# Patient Record
Sex: FEMALE | HISPANIC OR LATINO | Employment: STUDENT | URBAN - NONMETROPOLITAN AREA
[De-identification: names, ages, dates, MRNs, and addresses within clinical notes are randomized per-mention and may not be internally consistent; named-entity substitution may affect disease eponyms.]

---

## 2017-03-16 ENCOUNTER — OFFICE VISIT (OUTPATIENT)
Dept: PULMONOLOGY | Facility: CLINIC | Age: 22
End: 2017-03-16

## 2017-03-16 ENCOUNTER — LAB (OUTPATIENT)
Dept: LAB | Facility: HOSPITAL | Age: 22
End: 2017-03-16
Attending: INTERNAL MEDICINE

## 2017-03-16 ENCOUNTER — PROCEDURE VISIT (OUTPATIENT)
Dept: PULMONOLOGY | Facility: CLINIC | Age: 22
End: 2017-03-16

## 2017-03-16 VITALS
DIASTOLIC BLOOD PRESSURE: 64 MMHG | HEART RATE: 71 BPM | BODY MASS INDEX: 16.62 KG/M2 | RESPIRATION RATE: 18 BRPM | OXYGEN SATURATION: 98 % | SYSTOLIC BLOOD PRESSURE: 94 MMHG | WEIGHT: 88 LBS | HEIGHT: 61 IN

## 2017-03-16 DIAGNOSIS — R06.02 SHORTNESS OF BREATH: Primary | ICD-10-CM

## 2017-03-16 DIAGNOSIS — J30.89 OTHER ALLERGIC RHINITIS: ICD-10-CM

## 2017-03-16 DIAGNOSIS — J45.40 MODERATE PERSISTENT ASTHMA WITHOUT COMPLICATION: ICD-10-CM

## 2017-03-16 DIAGNOSIS — R06.02 SOB (SHORTNESS OF BREATH): Primary | ICD-10-CM

## 2017-03-16 PROCEDURE — 86003 ALLG SPEC IGE CRUDE XTRC EA: CPT | Performed by: INTERNAL MEDICINE

## 2017-03-16 PROCEDURE — 99244 OFF/OP CNSLTJ NEW/EST MOD 40: CPT | Performed by: INTERNAL MEDICINE

## 2017-03-16 PROCEDURE — 36415 COLL VENOUS BLD VENIPUNCTURE: CPT

## 2017-03-16 PROCEDURE — 94726 PLETHYSMOGRAPHY LUNG VOLUMES: CPT | Performed by: INTERNAL MEDICINE

## 2017-03-16 PROCEDURE — 95012 NITRIC OXIDE EXP GAS DETER: CPT | Performed by: INTERNAL MEDICINE

## 2017-03-16 PROCEDURE — 94060 EVALUATION OF WHEEZING: CPT | Performed by: INTERNAL MEDICINE

## 2017-03-16 PROCEDURE — 82785 ASSAY OF IGE: CPT | Performed by: INTERNAL MEDICINE

## 2017-03-16 RX ORDER — FLUNISOLIDE 0.25 MG/ML
1 SOLUTION NASAL EVERY 12 HOURS
Qty: 1 BOTTLE | Refills: 5 | Status: SHIPPED | OUTPATIENT
Start: 2017-03-16 | End: 2017-07-31 | Stop reason: SDUPTHER

## 2017-03-16 RX ORDER — MONTELUKAST SODIUM 10 MG/1
10 TABLET ORAL NIGHTLY
COMMUNITY
End: 2017-07-31 | Stop reason: SDUPTHER

## 2017-03-16 NOTE — PROGRESS NOTES
"CONSULT NOTE    Chief Complaint   Patient presents with   • Asthma     NP       Subjective   Jocelyne Barth is a 21 y.o. female.     History of Present Illness   Patient comes in today for consultation because of shortness of breath for the past 1 year.  The patient recently moved from [place] and although she had asthma as a child, her symptoms were under decent control.  She notices some worsening in her symptoms and says that she has been wheezing more often and also is having cough which is one of the major symptoms.    The patient denies any pets at home. Patient also complains of runny nose and dribbling in the back of the throat for the past few weeks. This has been sometimes associated with seasonal variation, and she feels that for the past 6 months it has been worse than before.    The patient denies any history of smoking.    The following portions of the patient's history were reviewed and updated as appropriate: allergies, current medications, past family history, past medical history, past social history and past surgical history.    Review of Systems   Constitutional: Positive for chills and fatigue.   HENT: Positive for sinus pressure, sneezing and sore throat.    Respiratory: Positive for choking and shortness of breath.    Cardiovascular: Negative.    Psychiatric/Behavioral: Positive for sleep disturbance.   All other systems reviewed and are negative.      Past Medical History   Diagnosis Date   • Asthma, extrinsic    • Bronchiectasis    • IBS (irritable bowel syndrome)        Social History   Substance Use Topics   • Smoking status: Never Smoker   • Smokeless tobacco: Never Used   • Alcohol use No         Objective   Visit Vitals   • BP 94/64   • Pulse 71   • Resp 18   • Ht 61\" (154.9 cm)   • Wt 88 lb (39.9 kg)   • SpO2 98%   • BMI 16.63 kg/m2     Physical Exam  Constitutional:            General: No acute distress noted. Able to communicate appropriately    Head/Face/Eyes:            No " significant facial abnormalities seen.            Extra ocular movement was intact.            Pupils appeared equal    ENT:            Sinuses were non tender on palpation.            Nostril showed moderate erythema.            Oropharynx was cobblestoned.    Neck:             Supple. No JVD noted.              Thyroid gland did not seem to be enlarged    Cardiovascular:              S1 + S2. Regular    Respiratory:            Respiratory effort was adequate            Normal to percussion.            Good Air Entry Bilaterally with no wheezing or crackles heard.    Musculoskeletal/Extremities:             Normal Gait.              No clubbing, cyanosis noted in the upper extremities.             No edema noted in the lower extremities bilaterally.    Neurologic/Psychiatric:             Affect appeared fair.             Awake, alert and oriented x 3.             Able to follow simple commands.    Skin:             No rash noted.             Warm and dry        Assessment/Plan   Jocelyne was seen today for asthma.    Diagnoses and all orders for this visit:    Shortness of breath  -     Nitric Oxide    Moderate persistent asthma without complication  -     Pulmonary Function Test  -     Nitric Oxide    Other allergic rhinitis  -     IgE; Future  -     Allergens, Zone 8; Future    Other orders  -     flunisolide (NASALIDE) 25 MCG/ACT (0.025%) solution nasal spray; Inhale 1 spray Every 12 (Twelve) Hours.           Return in about 10 weeks (around 5/25/2017) for Recheck, Labs.    DISCUSSION(if any):  I have also reviewed her last primary care provider's office note that mentions asthma and shortness of breath along with cough.    ==============================  ==============================    I had a detailed discussion with the patient regarding her symptoms which are very suggestive of moderate persistent asthma.    She was advised to continue her current medications.    Patient will be started on nasal steroids  for symptoms which are definitely consistent with allergic rhinitis.     I have ordered IgE/RAST panel.    Other contributing factors may also need to be treated and this will be discussed with the patient, if and when applicable.    The patient was advised to maintain a log of the use of rescue inhaler.    FeNO level was reviewed.    PFTs were reviewed.    Patient was given reading material.          Errors in dictation may reflect use of voice recognition software and not all errors in transcription may have been detected prior to signing    This document was electronically signed by Virginia Nicholson MD March 16, 2017  10:24 AM

## 2017-03-17 LAB — TOTAL IGE SMQN RAST: 16 IU/ML (ref 0–100)

## 2017-03-20 LAB
A ALTERNATA IGE QN: <0.1 KU/L
A FUMIGATUS IGE QN: <0.1 KU/L
AMER ROACH IGE QN: <0.1 KU/L
BAHIA GRASS IGE QN: <0.1 KU/L
BERMUDA GRASS IGE QN: <0.1 KU/L
BOXELDER IGE QN: <0.1 KU/L
C HERBARUM IGE QN: <0.1 KU/L
CAT DANDER IGG QN: <0.1 KU/L
CMN PIGWEED IGE QN: <0.1 KU/L
COMMON RAGWEED IGE QN: <0.1 KU/L
CONV CLASS DESCRIPTION: NORMAL
D FARINAE IGE QN: <0.1 KU/L
D PTERONYSS IGE QN: <0.1 KU/L
DOG DANDER IGE QN: <0.1 KU/L
ENGL PLANTAIN IGE QN: <0.1 KU/L
HAZELNUT POLN IGE QN: <0.1 KU/L
JOHNSON GRASS IGE QN: <0.1 KU/L
KENT BLUE GRASS IGE QN: <0.1 KU/L
M RACEMOSUS IGE QN: <0.1 KU/L
MT JUNIPER IGE QN: <0.1 KU/L
MUGWORT IGE QN: <0.1 KU/L
NETTLE IGE QN: <0.1 KU/L
P NOTATUM IGE QN: <0.1 KU/L
S BOTRYOSUM IGE QN: <0.1 KU/L
SHEEP SORREL IGE QN: <0.1 KU/L
SWEET GUM IGE QN: <0.1 KU/L
T011-IGE MAPLE LEAF SYCAMORE: <0.1 KU/L
WHITE ELM IGE QN: <0.1 KU/L
WHITE HICKORY IGE QN: <0.1 KU/L
WHITE MULBERRY IGE QN: <0.1 KU/L
WHITE OAK IGE QN: <0.1 KU/L

## 2017-05-25 ENCOUNTER — OFFICE VISIT (OUTPATIENT)
Dept: PULMONOLOGY | Facility: CLINIC | Age: 22
End: 2017-05-25

## 2017-05-25 ENCOUNTER — APPOINTMENT (OUTPATIENT)
Dept: LAB | Facility: HOSPITAL | Age: 22
End: 2017-05-25
Attending: INTERNAL MEDICINE

## 2017-05-25 ENCOUNTER — LAB (OUTPATIENT)
Dept: LAB | Facility: HOSPITAL | Age: 22
End: 2017-05-25
Attending: INTERNAL MEDICINE

## 2017-05-25 ENCOUNTER — TRANSCRIBE ORDERS (OUTPATIENT)
Dept: ADMINISTRATIVE | Facility: HOSPITAL | Age: 22
End: 2017-05-25

## 2017-05-25 VITALS
HEART RATE: 76 BPM | OXYGEN SATURATION: 98 % | DIASTOLIC BLOOD PRESSURE: 64 MMHG | WEIGHT: 87 LBS | RESPIRATION RATE: 18 BRPM | HEIGHT: 61 IN | BODY MASS INDEX: 16.42 KG/M2 | SYSTOLIC BLOOD PRESSURE: 100 MMHG

## 2017-05-25 DIAGNOSIS — R06.02 SHORTNESS OF BREATH: Primary | ICD-10-CM

## 2017-05-25 DIAGNOSIS — R06.02 SHORTNESS OF BREATH: ICD-10-CM

## 2017-05-25 DIAGNOSIS — J30.89 OTHER ALLERGIC RHINITIS: ICD-10-CM

## 2017-05-25 DIAGNOSIS — J45.40 MODERATE PERSISTENT ASTHMA WITHOUT COMPLICATION: ICD-10-CM

## 2017-05-25 DIAGNOSIS — R06.02 SOB (SHORTNESS OF BREATH): Primary | ICD-10-CM

## 2017-05-25 LAB
BASOPHILS # BLD AUTO: 0.02 10*3/MM3 (ref 0–0.2)
BASOPHILS NFR BLD AUTO: 0.5 % (ref 0–2.5)
DEPRECATED RDW RBC AUTO: 41.7 FL (ref 37–54)
EOSINOPHIL # BLD AUTO: 0.06 10*3/MM3 (ref 0–0.7)
EOSINOPHIL NFR BLD AUTO: 1.4 % (ref 0–7)
ERYTHROCYTE [DISTWIDTH] IN BLOOD BY AUTOMATED COUNT: 11.4 % (ref 11.5–14.5)
HCT VFR BLD AUTO: 41 % (ref 37–47)
HGB BLD-MCNC: 13.4 G/DL (ref 12–16)
IMM GRANULOCYTES # BLD: 0.01 10*3/MM3 (ref 0–0.06)
IMM GRANULOCYTES NFR BLD: 0.2 % (ref 0–0.6)
LYMPHOCYTES # BLD AUTO: 1.5 10*3/MM3 (ref 0.6–3.4)
LYMPHOCYTES NFR BLD AUTO: 34.5 % (ref 10–50)
MCH RBC QN AUTO: 32.2 PG (ref 27–31)
MCHC RBC AUTO-ENTMCNC: 32.7 G/DL (ref 30–37)
MCV RBC AUTO: 98.6 FL (ref 81–99)
MONOCYTES # BLD AUTO: 0.42 10*3/MM3 (ref 0–0.9)
MONOCYTES NFR BLD AUTO: 9.7 % (ref 0–12)
NEUTROPHILS # BLD AUTO: 2.34 10*3/MM3 (ref 2–6.9)
NEUTROPHILS NFR BLD AUTO: 53.7 % (ref 37–80)
NRBC BLD MANUAL-RTO: 0 /100 WBC (ref 0–0)
PLATELET # BLD AUTO: 245 10*3/MM3 (ref 130–400)
PMV BLD AUTO: 10.7 FL (ref 6–12)
RBC # BLD AUTO: 4.16 10*6/MM3 (ref 4.2–5.4)
WBC NRBC COR # BLD: 4.35 10*3/MM3 (ref 4.8–10.8)

## 2017-05-25 PROCEDURE — 94729 DIFFUSING CAPACITY: CPT | Performed by: INTERNAL MEDICINE

## 2017-05-25 PROCEDURE — 94726 PLETHYSMOGRAPHY LUNG VOLUMES: CPT | Performed by: INTERNAL MEDICINE

## 2017-05-25 PROCEDURE — 85025 COMPLETE CBC W/AUTO DIFF WBC: CPT | Performed by: INTERNAL MEDICINE

## 2017-05-25 PROCEDURE — 99214 OFFICE O/P EST MOD 30 MIN: CPT | Performed by: INTERNAL MEDICINE

## 2017-05-25 PROCEDURE — 94060 EVALUATION OF WHEEZING: CPT | Performed by: INTERNAL MEDICINE

## 2017-05-25 PROCEDURE — 36415 COLL VENOUS BLD VENIPUNCTURE: CPT | Performed by: INTERNAL MEDICINE

## 2017-07-31 ENCOUNTER — OFFICE VISIT (OUTPATIENT)
Dept: PULMONOLOGY | Facility: CLINIC | Age: 22
End: 2017-07-31

## 2017-07-31 VITALS
HEART RATE: 84 BPM | WEIGHT: 90.5 LBS | HEIGHT: 61 IN | RESPIRATION RATE: 14 BRPM | BODY MASS INDEX: 17.09 KG/M2 | OXYGEN SATURATION: 98 % | DIASTOLIC BLOOD PRESSURE: 59 MMHG | SYSTOLIC BLOOD PRESSURE: 89 MMHG

## 2017-07-31 DIAGNOSIS — J30.89 OTHER ALLERGIC RHINITIS: ICD-10-CM

## 2017-07-31 DIAGNOSIS — R06.02 SOB (SHORTNESS OF BREATH): Primary | ICD-10-CM

## 2017-07-31 DIAGNOSIS — J45.40 MODERATE PERSISTENT ASTHMA WITHOUT COMPLICATION: ICD-10-CM

## 2017-07-31 PROCEDURE — 99214 OFFICE O/P EST MOD 30 MIN: CPT | Performed by: NURSE PRACTITIONER

## 2017-07-31 RX ORDER — FLUNISOLIDE 0.25 MG/ML
1 SOLUTION NASAL EVERY 12 HOURS
Qty: 1 BOTTLE | Refills: 5 | Status: SHIPPED | OUTPATIENT
Start: 2017-07-31 | End: 2018-01-30 | Stop reason: SDUPTHER

## 2017-07-31 RX ORDER — ALBUTEROL SULFATE 90 UG/1
2 AEROSOL, METERED RESPIRATORY (INHALATION) EVERY 6 HOURS PRN
Qty: 1 INHALER | Refills: 2 | Status: SHIPPED | OUTPATIENT
Start: 2017-07-31 | End: 2018-01-30 | Stop reason: SDUPTHER

## 2017-07-31 RX ORDER — MONTELUKAST SODIUM 10 MG/1
10 TABLET ORAL NIGHTLY
Qty: 30 TABLET | Refills: 3 | Status: SHIPPED | OUTPATIENT
Start: 2017-07-31 | End: 2018-01-30 | Stop reason: SDUPTHER

## 2017-07-31 RX ORDER — LANSOPRAZOLE 30 MG/1
30 CAPSULE, DELAYED RELEASE ORAL DAILY
Qty: 30 CAPSULE | Refills: 1 | Status: SHIPPED | OUTPATIENT
Start: 2017-07-31 | End: 2017-11-09 | Stop reason: ALTCHOICE

## 2017-11-09 ENCOUNTER — OFFICE VISIT (OUTPATIENT)
Dept: PULMONOLOGY | Facility: CLINIC | Age: 22
End: 2017-11-09

## 2017-11-09 VITALS
WEIGHT: 91.4 LBS | OXYGEN SATURATION: 98 % | SYSTOLIC BLOOD PRESSURE: 80 MMHG | DIASTOLIC BLOOD PRESSURE: 57 MMHG | BODY MASS INDEX: 16.82 KG/M2 | HEIGHT: 62 IN | HEART RATE: 55 BPM | RESPIRATION RATE: 16 BRPM

## 2017-11-09 DIAGNOSIS — R06.02 SHORTNESS OF BREATH: ICD-10-CM

## 2017-11-09 DIAGNOSIS — J45.40 MODERATE PERSISTENT ASTHMA WITHOUT COMPLICATION: ICD-10-CM

## 2017-11-09 DIAGNOSIS — K21.9 GASTROESOPHAGEAL REFLUX DISEASE, ESOPHAGITIS PRESENCE NOT SPECIFIED: Primary | ICD-10-CM

## 2017-11-09 DIAGNOSIS — J30.89 OTHER ALLERGIC RHINITIS: ICD-10-CM

## 2017-11-09 PROCEDURE — 99214 OFFICE O/P EST MOD 30 MIN: CPT | Performed by: INTERNAL MEDICINE

## 2017-11-09 PROCEDURE — 95012 NITRIC OXIDE EXP GAS DETER: CPT | Performed by: INTERNAL MEDICINE

## 2017-11-09 RX ORDER — ESOMEPRAZOLE MAGNESIUM 40 MG/1
40 FOR SUSPENSION ORAL 2 TIMES DAILY
Qty: 60 PACKET | Refills: 1 | Status: SHIPPED | OUTPATIENT
Start: 2017-11-09 | End: 2017-11-09 | Stop reason: SDUPTHER

## 2017-11-09 RX ORDER — PANTOPRAZOLE SODIUM 40 MG/1
40 TABLET, DELAYED RELEASE ORAL 2 TIMES DAILY
Qty: 60 TABLET | Refills: 1 | Status: SHIPPED | OUTPATIENT
Start: 2017-11-09 | End: 2018-01-17 | Stop reason: SDUPTHER

## 2017-11-09 RX ORDER — ESOMEPRAZOLE MAGNESIUM 40 MG/1
20 FOR SUSPENSION ORAL 2 TIMES DAILY
Qty: 60 PACKET | Refills: 1 | Status: SHIPPED | OUTPATIENT
Start: 2017-11-09 | End: 2017-11-09

## 2017-11-09 NOTE — PROGRESS NOTES
"Chief Complaint   Patient presents with   • Follow-up   • Shortness of Breath         Subjective   Jocelyne Barth is a 22 y.o. female.     History of Present Illness   Patient comes in today to follow-up on asthma and allergic rhinitis.    Patient has stopped using all her asthma medications and says that her symptoms are only somewhat worsened.    She is using her rescue inhaler at least once or twice a day but tries not to use that either.  When asked, she says that she has significant acid reflux which is making it almost impossible for her to use any medication as she is afraid that her symptoms are worsened?    The patient is once again complaining of runny nose and dribbling in the back of her throat.    The patient wakes up in the morning with scratchy throat and hoarseness and occasional wheezing.    The following portions of the patient's history were reviewed and updated as appropriate: allergies, current medications, past family history, past medical history, past social history and past surgical history.    Review of Systems   Constitutional: Positive for fatigue. Negative for chills and fever.   HENT: Positive for congestion and sore throat. Negative for sinus pressure and sneezing.    Respiratory: Positive for chest tightness. Negative for cough and wheezing.        Objective   Visit Vitals   • BP (!) 80/57   • Pulse 55   • Resp 16   • Ht 62\" (157.5 cm)   • Wt 91 lb 6.4 oz (41.5 kg)   • SpO2 98%   • BMI 16.72 kg/m2       Physical Exam   Constitutional: She is oriented to person, place, and time. She appears well-developed and well-nourished.   HENT:   Sinuses were non tender on palpation.  Nostril showed moderate erythema.  Oropharynx was cobblestoned.   Eyes: EOM are normal.   Neck: Neck supple. No JVD present.   Cardiovascular: Normal rate and regular rhythm.    Pulmonary/Chest: Effort normal and breath sounds normal.   Musculoskeletal:   Gait was normal.   Neurological: She is alert and " oriented to person, place, and time.   Skin: Skin is warm and dry.   Vitals reviewed.        Assessment/Plan   Jocelyne was seen today for follow-up and shortness of breath.    Diagnoses and all orders for this visit:    Gastroesophageal reflux disease, esophagitis presence not specified    Moderate persistent asthma without complication  -     Nitric Oxide    Other allergic rhinitis    Shortness of breath  -     Nitric Oxide    Other orders  -     Discontinue: esomeprazole (NEXIUM) 40 MG packet; Take 40 mg by mouth 2 (Two) Times a Day.  -     Discontinue: esomeprazole (NEXIUM) 40 MG packet; Take 20 mg by mouth 2 (Two) Times a Day.  -     pantoprazole (PROTONIX) 40 MG EC tablet; Take 1 tablet by mouth 2 (Two) Times a Day.         Return in about 10 weeks (around 1/18/2018) for Recheck, For Rosa.    DISCUSSION (if any):  She clearly has symptoms of gastroesophageal reflux disease and I prescribed Protonix 40 mg to be taken twice daily.    Told the patient that if her symptoms do not improve over the next 2-4 weeks, then she should definitely ask her primary care provider for further recommendations and possible gastroenterology workup/consultation.    As far as asthma is concerned, she will need to restart all her medications especially given some worsening symptoms.    She will definitely need to be on nasal steroids again, as her allergic rhinitis is also worsened.    FeNO level was < 5.    Compliance was stressed to the patient.      Dictated utilizing Dragon dictation.    This document was electronically signed by Virginia Nicholson MD November 9, 2017  10:46 AM

## 2017-12-21 ENCOUNTER — OFFICE VISIT (OUTPATIENT)
Dept: GASTROENTEROLOGY | Facility: CLINIC | Age: 22
End: 2017-12-21

## 2017-12-21 ENCOUNTER — PREP FOR SURGERY (OUTPATIENT)
Dept: OTHER | Facility: HOSPITAL | Age: 22
End: 2017-12-21

## 2017-12-21 VITALS
WEIGHT: 92 LBS | HEIGHT: 61 IN | DIASTOLIC BLOOD PRESSURE: 41 MMHG | BODY MASS INDEX: 17.37 KG/M2 | SYSTOLIC BLOOD PRESSURE: 80 MMHG | RESPIRATION RATE: 14 BRPM | TEMPERATURE: 98.2 F | HEART RATE: 55 BPM

## 2017-12-21 DIAGNOSIS — R10.32 LEFT LOWER QUADRANT PAIN: Primary | ICD-10-CM

## 2017-12-21 DIAGNOSIS — R19.7 DIARRHEA, UNSPECIFIED TYPE: Chronic | ICD-10-CM

## 2017-12-21 DIAGNOSIS — R11.0 NAUSEA: Primary | ICD-10-CM

## 2017-12-21 DIAGNOSIS — R12 HEARTBURN: Chronic | ICD-10-CM

## 2017-12-21 DIAGNOSIS — R11.0 NAUSEA: Chronic | ICD-10-CM

## 2017-12-21 DIAGNOSIS — K59.00 CONSTIPATION, UNSPECIFIED CONSTIPATION TYPE: ICD-10-CM

## 2017-12-21 DIAGNOSIS — R19.7 DIARRHEA, UNSPECIFIED TYPE: ICD-10-CM

## 2017-12-21 DIAGNOSIS — R10.13 EPIGASTRIC PAIN: ICD-10-CM

## 2017-12-21 DIAGNOSIS — R13.10 DYSPHAGIA, UNSPECIFIED TYPE: ICD-10-CM

## 2017-12-21 DIAGNOSIS — R13.10 DYSPHAGIA, UNSPECIFIED TYPE: Chronic | ICD-10-CM

## 2017-12-21 DIAGNOSIS — Z12.11 COLON CANCER SCREENING: ICD-10-CM

## 2017-12-21 DIAGNOSIS — R12 HEARTBURN: ICD-10-CM

## 2017-12-21 DIAGNOSIS — R10.13 EPIGASTRIC PAIN: Chronic | ICD-10-CM

## 2017-12-21 DIAGNOSIS — K59.00 CONSTIPATION, UNSPECIFIED CONSTIPATION TYPE: Chronic | ICD-10-CM

## 2017-12-21 PROBLEM — K21.9 CHRONIC GERD: Status: ACTIVE | Noted: 2017-12-21

## 2017-12-21 PROBLEM — J45.30 MILD PERSISTENT ASTHMA WITHOUT COMPLICATION: Status: ACTIVE | Noted: 2017-12-21

## 2017-12-21 PROBLEM — R53.83 FATIGUE: Status: ACTIVE | Noted: 2017-12-21

## 2017-12-21 PROCEDURE — 99214 OFFICE O/P EST MOD 30 MIN: CPT | Performed by: NURSE PRACTITIONER

## 2017-12-21 RX ORDER — CIPROFLOXACIN 500 MG/1
TABLET, FILM COATED ORAL
Qty: 14 TABLET | Refills: 0 | Status: ON HOLD | OUTPATIENT
Start: 2017-12-21 | End: 2018-01-08

## 2017-12-21 RX ORDER — SODIUM CHLORIDE 9 MG/ML
70 INJECTION, SOLUTION INTRAVENOUS CONTINUOUS PRN
Status: CANCELLED | OUTPATIENT
Start: 2017-12-21

## 2017-12-21 RX ORDER — ONDANSETRON 4 MG/1
4 TABLET, FILM COATED ORAL EVERY 8 HOURS PRN
Qty: 30 TABLET | Refills: 1 | Status: SHIPPED | OUTPATIENT
Start: 2017-12-21 | End: 2018-01-20

## 2017-12-21 RX ORDER — METRONIDAZOLE 250 MG/1
250 TABLET ORAL 4 TIMES DAILY
Qty: 28 TABLET | Refills: 0 | Status: SHIPPED | OUTPATIENT
Start: 2017-12-21 | End: 2017-12-28

## 2017-12-21 NOTE — PATIENT INSTRUCTIONS
1.Treatment for diverticulitis:  A. Low-fat low fiber diet for 5 days thereafter low-fat high-fiber diet.   B. Cipro (ciprofloxacin) tablets 500 mg. Take 1 tablet by mouth twice a day for 7 days. Side effects were discussed.   C. Flagyl (metronidazole) tablets 250 mg. Take 1 tablet one by mouth 4 times a day for 7 days. Side effects were discussed.  D. Avoid laxatives, enemas for next 5 days. However, for constipation the patient may use stool softeners.  E. Colonoscopy: Description of the procedure, risks, benefits, alternatives and options, including nonoperative options, were discussed with the patient in detail. The patient understands and wishes to proceed, although it may be advisable to wait 3-4 weeks to schedule procedure.  2. Antireflux measures: Avoid fried, fatty foods, alcohol, chocolate, coffee, tea,  soft drinks, peppermint and spearmint, spicy foods, tomatoes and tomato based foods, onion based foods, and smoking. Other antireflux measures include weight reduction if overweight, avoiding tight clothing around the abdomen, elevating the head of the bed 6 inches with blocks under the head board, and don't drink or eat before going to bed and avoid lying down immediately after meals.  3. Continue Pantoprazole 40 mg 1 po twice a day, take am dose 30 minutes before breakfast.  4. Zofran 4 mg 1 po every 8 hours as needed for nausea.  5. Upper endoscopy-EGD: Description of the procedure, risks, benefits, alternatives and options, including nonoperative options, were discussed with the patient in detail. The patient understands and wishes to proceed.

## 2017-12-21 NOTE — PROGRESS NOTES
"Chief Complaint   Patient presents with   • Abdominal Pain   • Nausea   • Heartburn   • Difficulty Swallowing   • Constipation   • Diarrhea     There is a long-standing history of heartburn. The patient has been taking Pantoprazole 40 mg twice a day with moderate improvement of heartburn. The patient has heartburn daily. It can be severe. The patient does wake at night with heartburn.    The patient feels like something is \"stuck\" in her esophagus after she eats. This occurs every time she eats. The difficulty swallowing occurs with solids and liquids.    The patient has a history of epigastric pain since July 2017. The pain occurs daily. The pain is moderate. Eating worsens the pain. She feels like her food is \"not digesting\" and is causing the pain. At times, the patient has had pain in her lower left quadrant. The pain is moderate. It will come and go. The pain started about one month ago.    The patient has a history of new onset nausea. The patient has nausea every morning when she wakes up. Eating can make the nausea better. She is not taking anything for nausea. The patient denies possibility of pregnancy.    Over the past 1 year, the patient has been having diarrhea alternating with constipation. This has worsened over the past month or so. The patient has more constipation symptoms than diarrhea. The patient has tried Miralax perhaps once per week for the constipation with good results. She may have 1 bowel movement every week or so. The patient then may have diarrhea 1-2 days per month with 2-4 episodes of diarrhea during that day. The patient denies bright red blood per rectum or melena.    The patient has not had a colonoscopy in the past. There is a family history of colon cancer in the patient's uncle.    Abdominal Pain   This is a chronic problem. Episode onset: July 2017. The onset quality is sudden. The problem occurs daily. The problem has been unchanged. The pain is located in the epigastric " region. The quality of the pain is burning. The abdominal pain does not radiate. Associated symptoms include constipation, diarrhea, headaches and nausea. Pertinent negatives include no arthralgias, dysuria, fever, hematuria, myalgias or vomiting. The pain is aggravated by eating. The pain is relieved by nothing. She has tried proton pump inhibitors for the symptoms. The treatment provided no relief. Her past medical history is significant for GERD.   Nausea   This is a new problem. The current episode started in the past 7 days. The problem occurs daily. The problem has been unchanged. Associated symptoms include abdominal pain, fatigue, headaches and nausea. Pertinent negatives include no arthralgias, chest pain, chills, coughing, fever, joint swelling, myalgias, rash or vomiting. Nothing aggravates the symptoms. She has tried eating for the symptoms. The treatment provided moderate relief.   Heartburn   She complains of abdominal pain, heartburn and nausea. She reports no chest pain or no coughing. This is a chronic problem. Episode onset: 2014. The problem occurs frequently. The problem has been unchanged. The heartburn duration is an hour. The heartburn is located in the substernum. The heartburn is of severe intensity. The heartburn wakes her from sleep. Nothing aggravates the symptoms. Associated symptoms include fatigue. She has tried a PPI for the symptoms. The treatment provided moderate relief.   Difficulty Swallowing   This is a chronic problem. Episode onset: July 2017. The problem occurs 2 to 4 times per day. The problem has been unchanged. Associated symptoms include abdominal pain, fatigue, headaches and nausea. Pertinent negatives include no arthralgias, chest pain, chills, coughing, fever, joint swelling, myalgias, rash or vomiting. The symptoms are aggravated by drinking and eating. She has tried nothing for the symptoms.   Constipation   This is a chronic problem. The current episode started more  than 1 year ago. The stool is described as firm. The patient is on a high fiber diet. There has been adequate water intake. Associated symptoms include abdominal pain, diarrhea and nausea. Pertinent negatives include no fever or vomiting. Treatments tried: Miralax. The treatment provided significant relief.   Diarrhea    Associated symptoms include abdominal pain and headaches. Pertinent negatives include no arthralgias, chills, coughing, fever, myalgias or vomiting.     Review of Systems   Constitutional: Positive for fatigue. Negative for appetite change, chills, fever and unexpected weight change.   HENT: Positive for trouble swallowing. Negative for mouth sores and nosebleeds.    Eyes: Negative for discharge and redness.   Respiratory: Negative for apnea, cough and shortness of breath.    Cardiovascular: Positive for palpitations. Negative for chest pain and leg swelling.   Gastrointestinal: Positive for abdominal distention, abdominal pain, constipation, diarrhea, heartburn and nausea. Negative for anal bleeding, blood in stool and vomiting.   Endocrine: Negative for cold intolerance, heat intolerance and polydipsia.   Genitourinary: Negative for dysuria, hematuria and urgency.   Musculoskeletal: Negative for arthralgias, joint swelling and myalgias.   Skin: Negative for rash.   Allergic/Immunologic: Positive for food allergies. Negative for immunocompromised state.        Nuts and eggs   Neurological: Positive for headaches. Negative for dizziness, seizures and syncope.   Hematological: Negative for adenopathy. Does not bruise/bleed easily.   Psychiatric/Behavioral: Negative for dysphoric mood. The patient is not nervous/anxious and is not hyperactive.      Patient Active Problem List   Diagnosis   • Asthma   • Chronic GERD   • Fatigue   • Mild persistent asthma without complication   • Left lower quadrant pain   • Constipation   • Diarrhea   • Epigastric pain   • Heartburn   • Dysphagia   • Nausea     Past  Medical History:   Diagnosis Date   • Asthma, extrinsic    • B12 deficiency 2017   • Bronchiectasis    • GERD (gastroesophageal reflux disease)    • IBS (irritable bowel syndrome)    • Migraines 2010     Past Surgical History:   Procedure Laterality Date   • NO PAST SURGERIES       Family History   Problem Relation Age of Onset   • Irritable bowel syndrome Mother    • No Known Problems Father    • Colon cancer Maternal Uncle    • Stomach cancer Maternal Grandfather      Social History   Substance Use Topics   • Smoking status: Never Smoker   • Smokeless tobacco: Never Used   • Alcohol use No       Current Outpatient Prescriptions:   •  albuterol (PROVENTIL HFA;VENTOLIN HFA) 108 (90 BASE) MCG/ACT inhaler, Inhale 2 puffs Every 6 (Six) Hours As Needed for Wheezing or Shortness of Air., Disp: 1 inhaler, Rfl: 2  •  Cetirizine HCl 10 MG capsule, Take 10 mg by mouth Every Night., Disp: 30 capsule, Rfl: 3  •  flunisolide (NASALIDE) 25 MCG/ACT (0.025%) solution nasal spray, Inhale 1 spray Every 12 (Twelve) Hours., Disp: 1 bottle, Rfl: 5  •  fluticasone-salmeterol (ADVAIR) 500-50 MCG/DOSE DISKUS, Inhale 1 puff 2 (Two) Times a Day., Disp: 60 each, Rfl: 3  •  montelukast (SINGULAIR) 10 MG tablet, Take 1 tablet by mouth Every Night., Disp: 30 tablet, Rfl: 3  •  pantoprazole (PROTONIX) 40 MG EC tablet, Take 1 tablet by mouth 2 (Two) Times a Day., Disp: 60 tablet, Rfl: 1  •  Tiotropium Bromide Monohydrate (SPIRIVA RESPIMAT) 1.25 MCG/ACT aerosol solution, Inhale 2 puffs Daily., Disp: 1 inhaler, Rfl: 0  •  ciprofloxacin (CIPRO) 500 MG tablet, Take 1 tablet twice a day x 7 days, Disp: 14 tablet, Rfl: 0  •  metroNIDAZOLE (FLAGYL) 250 MG tablet, Take 1 tablet by mouth 4 (Four) Times a Day for 7 days. Take 1 tablet four times daily x 7 days, Disp: 28 tablet, Rfl: 0  •  ondansetron (ZOFRAN) 4 MG tablet, Take 1 tablet by mouth Every 8 (Eight) Hours As Needed for Nausea or Vomiting for up to 30 days., Disp: 30 tablet, Rfl: 1    Allergies  "  Allergen Reactions   • Ibuprofen Itching and Rash     BP (!) 80/41  Pulse 55  Temp 98.2 °F (36.8 °C)  Resp 14  Ht 154.9 cm (61\")  Wt 41.7 kg (92 lb)  LMP 12/19/2017  BMI 17.38 kg/m2    Physical Exam   Constitutional: She is oriented to person, place, and time. She appears well-developed and well-nourished. No distress.   HENT:   Head: Normocephalic and atraumatic.   Right Ear: Hearing and external ear normal.   Left Ear: Hearing and external ear normal.   Nose: Nose normal.   Mouth/Throat: Oropharynx is clear and moist and mucous membranes are normal. Mucous membranes are not pale, not dry and not cyanotic. No oral lesions. No oropharyngeal exudate.   Eyes: Conjunctivae and EOM are normal. Right eye exhibits no discharge. Left eye exhibits no discharge.   Neck: Trachea normal. Neck supple. No JVD present. No edema present. No thyroid mass and no thyromegaly present.   Cardiovascular: Normal rate, regular rhythm, S2 normal and normal heart sounds.  Exam reveals no gallop, no S3 and no friction rub.    No murmur heard.  Pulmonary/Chest: Effort normal and breath sounds normal. No respiratory distress. She exhibits no tenderness.   Abdominal: Normal appearance and bowel sounds are normal. She exhibits no distension, no ascites and no mass. There is no splenomegaly or hepatomegaly. There is tenderness (moderate) in the epigastric area and left lower quadrant. There is no rigidity, no rebound and no guarding. No hernia.       Vascular Status -  Her exam exhibits no right foot edema. Her exam exhibits no left foot edema.  Lymphadenopathy:     She has no cervical adenopathy.        Left: No supraclavicular adenopathy present.   Neurological: She is alert and oriented to person, place, and time. She has normal strength. No cranial nerve deficit or sensory deficit.   Skin: No rash noted. She is not diaphoretic. No cyanosis. No pallor. Nails show no clubbing.   Psychiatric: She has a normal mood and affect.   Nursing " note and vitals reviewed.  Stigmata of chronic liver disease:  None.  Asterixis:  None.    Laboratory Results:  Upon review of records:    Dated 5/25/2017 WBC 4.35 hemoglobin 13.4 hematocrit 41.0 place a count 245 MCV 98.6    Assessment and Plan:    Jocelyne was seen today for abdominal pain, nausea, heartburn, difficulty swallowing, constipation and diarrhea.    Diagnoses and all orders for this visit:    Left lower quadrant pain  Comments:  Consistent with possible diverticulitis.  Orders:  -     ciprofloxacin (CIPRO) 500 MG tablet; Take 1 tablet twice a day x 7 days  -     metroNIDAZOLE (FLAGYL) 250 MG tablet; Take 1 tablet by mouth 4 (Four) Times a Day for 7 days. Take 1 tablet four times daily x 7 days    Constipation, unspecified constipation type  Comments:  History of long-standing constipation with recent worsening of symptoms.  Constipation alternates with diarrhea.  Orders:  -     ciprofloxacin (CIPRO) 500 MG tablet; Take 1 tablet twice a day x 7 days  -     metroNIDAZOLE (FLAGYL) 250 MG tablet; Take 1 tablet by mouth 4 (Four) Times a Day for 7 days. Take 1 tablet four times daily x 7 days    Diarrhea, unspecified type  Comments:  History of long-standing diarrhea with worsening of symptoms over the past 1 month.  Differentials include diverticulitis, microscopic colitis, underlying IBD.  Orders:  -     ciprofloxacin (CIPRO) 500 MG tablet; Take 1 tablet twice a day x 7 days  -     metroNIDAZOLE (FLAGYL) 250 MG tablet; Take 1 tablet by mouth 4 (Four) Times a Day for 7 days. Take 1 tablet four times daily x 7 days    Epigastric pain  Comments:  Differentials include delayed gastric emptying, peptic ulcer disease, pancreatobiliary disease.    Heartburn  Comments:  History of long-standing heartburn not controlled with pantoprazole twice a day.  Concerns for underlying Beaver's.    Dysphagia, unspecified type  Comments:  Differentials include Schatzki's ring, esophagitis, esophageal  dysmotility.    Nausea  Comments:  Differentials include delayed gastric emptying, peptic ulcer disease, pancreatobiliary disease.  Orders:  -     ondansetron (ZOFRAN) 4 MG tablet; Take 1 tablet by mouth Every 8 (Eight) Hours As Needed for Nausea or Vomiting for up to 30 days.        Plan  and Patient Instructions:  Patient Instructions   1.Treatment for diverticulitis:  A. Low-fat low fiber diet for 5 days thereafter low-fat high-fiber diet.   B. Cipro (ciprofloxacin) tablets 500 mg. Take 1 tablet by mouth twice a day for 7 days. Side effects were discussed.   C. Flagyl (metronidazole) tablets 250 mg. Take 1 tablet one by mouth 4 times a day for 7 days. Side effects were discussed.  D. Avoid laxatives, enemas for next 5 days. However, for constipation the patient may use stool softeners.  E. Colonoscopy: Description of the procedure, risks, benefits, alternatives and options, including nonoperative options, were discussed with the patient in detail. The patient understands and wishes to proceed, although it may be advisable to wait 3-4 weeks to schedule procedure.  2. Antireflux measures: Avoid fried, fatty foods, alcohol, chocolate, coffee, tea,  soft drinks, peppermint and spearmint, spicy foods, tomatoes and tomato based foods, onion based foods, and smoking. Other antireflux measures include weight reduction if overweight, avoiding tight clothing around the abdomen, elevating the head of the bed 6 inches with blocks under the head board, and don't drink or eat before going to bed and avoid lying down immediately after meals.  3. Continue Pantoprazole 40 mg 1 po twice a day, take am dose 30 minutes before breakfast.  4. Zofran 4 mg 1 po every 8 hours as needed for nausea.  5. Upper endoscopy-EGD: Description of the procedure, risks, benefits, alternatives and options, including nonoperative options, were discussed with the patient in detail. The patient understands and wishes to proceed.    Carl Davis,  APRN

## 2017-12-22 PROBLEM — Z12.11 COLON CANCER SCREENING: Status: ACTIVE | Noted: 2017-12-22

## 2018-01-08 ENCOUNTER — HOSPITAL ENCOUNTER (OUTPATIENT)
Facility: HOSPITAL | Age: 23
Setting detail: HOSPITAL OUTPATIENT SURGERY
Discharge: HOME | End: 2018-01-08
Attending: INTERNAL MEDICINE | Admitting: INTERNAL MEDICINE

## 2018-01-08 ENCOUNTER — ANESTHESIA EVENT (OUTPATIENT)
Dept: GASTROENTEROLOGY | Facility: HOSPITAL | Age: 23
End: 2018-01-08

## 2018-01-08 ENCOUNTER — ANESTHESIA (OUTPATIENT)
Dept: GASTROENTEROLOGY | Facility: HOSPITAL | Age: 23
End: 2018-01-08

## 2018-01-08 VITALS
DIASTOLIC BLOOD PRESSURE: 58 MMHG | WEIGHT: 92 LBS | SYSTOLIC BLOOD PRESSURE: 91 MMHG | OXYGEN SATURATION: 99 % | BODY MASS INDEX: 16.93 KG/M2 | HEART RATE: 52 BPM | HEIGHT: 62 IN | RESPIRATION RATE: 18 BRPM | TEMPERATURE: 97.2 F

## 2018-01-08 DIAGNOSIS — R12 HEARTBURN: ICD-10-CM

## 2018-01-08 DIAGNOSIS — R11.0 NAUSEA: ICD-10-CM

## 2018-01-08 DIAGNOSIS — R13.10 DYSPHAGIA, UNSPECIFIED TYPE: ICD-10-CM

## 2018-01-08 DIAGNOSIS — R10.13 EPIGASTRIC PAIN: ICD-10-CM

## 2018-01-08 LAB
B-HCG UR QL: NEGATIVE
INTERNAL NEGATIVE CONTROL: NEGATIVE
INTERNAL POSITIVE CONTROL: POSITIVE
Lab: NORMAL

## 2018-01-08 PROCEDURE — 25010000002 MIDAZOLAM PER 1 MG: Performed by: NURSE ANESTHETIST, CERTIFIED REGISTERED

## 2018-01-08 PROCEDURE — 25010000002 PROPOFOL 200 MG/20ML EMULSION: Performed by: NURSE ANESTHETIST, CERTIFIED REGISTERED

## 2018-01-08 PROCEDURE — 43239 EGD BIOPSY SINGLE/MULTIPLE: CPT | Performed by: INTERNAL MEDICINE

## 2018-01-08 RX ORDER — SODIUM CHLORIDE 0.9 % (FLUSH) 0.9 %
3 SYRINGE (ML) INJECTION AS NEEDED
Status: DISCONTINUED | OUTPATIENT
Start: 2018-01-08 | End: 2018-01-08 | Stop reason: HOSPADM

## 2018-01-08 RX ORDER — MIDAZOLAM HYDROCHLORIDE 1 MG/ML
INJECTION INTRAMUSCULAR; INTRAVENOUS AS NEEDED
Status: DISCONTINUED | OUTPATIENT
Start: 2018-01-08 | End: 2018-01-08 | Stop reason: SURG

## 2018-01-08 RX ORDER — SODIUM CHLORIDE 9 MG/ML
70 INJECTION, SOLUTION INTRAVENOUS CONTINUOUS PRN
Status: DISCONTINUED | OUTPATIENT
Start: 2018-01-08 | End: 2018-01-08 | Stop reason: HOSPADM

## 2018-01-08 RX ORDER — PROPOFOL 10 MG/ML
INJECTION, EMULSION INTRAVENOUS AS NEEDED
Status: DISCONTINUED | OUTPATIENT
Start: 2018-01-08 | End: 2018-01-08 | Stop reason: SURG

## 2018-01-08 RX ADMIN — SODIUM CHLORIDE 70 ML/HR: 9 INJECTION, SOLUTION INTRAVENOUS at 09:00

## 2018-01-08 RX ADMIN — MIDAZOLAM HYDROCHLORIDE 1 MG: 1 INJECTION, SOLUTION INTRAMUSCULAR; INTRAVENOUS at 09:46

## 2018-01-08 RX ADMIN — PROPOFOL 50 MG: 10 INJECTION, EMULSION INTRAVENOUS at 09:51

## 2018-01-08 NOTE — PLAN OF CARE
Problem: GI Endoscopy (Adult)  Goal: Signs and Symptoms of Listed Potential Problems Will be Absent or Manageable (GI Endoscopy)  Outcome: Ongoing (interventions implemented as appropriate)   01/08/18 0924   GI Endoscopy   Problems Assessed (GI Endoscopy) all   Problems Present (GI Endoscopy) none

## 2018-01-08 NOTE — ANESTHESIA POSTPROCEDURE EVALUATION
Patient: Jocelyne Branch    Procedure Summary     Date Anesthesia Start Anesthesia Stop Room / Location    01/08/18 0946 1010 [facility] ENDOSCOPY 2 / [facility] ENDOSCOPY       Procedure Diagnosis Surgeon Provider    ESOPHAGOGASTRODUODENOSCOPY with biopsies (N/A Esophagus) Gastritis; Hiatal hernia; Esophagitis  (Heartburn [R12]; Nausea [R11.0]; Epigastric pain [R10.13]; Dysphagia, unspecified type [R13.10]) MD Aris Fabian CRNA          Anesthesia Type: MAC  Last vitals  BP 93/54   Temp   98.5   Pulse 58   Resp   16   SpO2   99     Post Anesthesia Care and Evaluation    Patient location during evaluation: bedside  Patient participation: complete - patient participated  Level of consciousness: awake and awake and alert  Pain score: 0  Pain management: satisfactory to patient  Airway patency: patent  Anesthetic complications: No anesthetic complications  PONV Status: none  Cardiovascular status: acceptable and stable  Respiratory status: acceptable, spontaneous ventilation, room air and nonlabored ventilation  Hydration status: acceptable

## 2018-01-08 NOTE — ANESTHESIA PREPROCEDURE EVALUATION
Anesthesia Evaluation     Patient summary reviewed and Nursing notes reviewed   no history of anesthetic complications:  NPO Solid Status: > 8 hours  NPO Liquid Status: > 8 hours     Airway   Mallampati: I  TM distance: >3 FB  Neck ROM: full  no difficulty expected  Dental - normal exam     Pulmonary - normal exam    breath sounds clear to auscultation  (+) asthma,   (-) not a smoker  Cardiovascular - negative cardio ROS and normal exam    Rhythm: regular  Rate: normal        Neuro/Psych  (+) headaches (Migraines),     GI/Hepatic/Renal/Endo    (+)  GERD,     Musculoskeletal (-) negative ROS    Abdominal    Substance History - negative use     OB/GYN negative ob/gyn ROS         Other - negative ROS                                               Anesthesia Plan    ASA 2     MAC   (Pt told that intravenous sedation will be used as the primary anesthetic. Every effort will be made to make sure the patient is comfortable.     The patient was told they may or may not have recall for the procedure.  Will proceed with the plan of care.)  intravenous induction   Anesthetic plan and risks discussed with patient.

## 2018-01-08 NOTE — OP NOTE
PROCEDURE:  Upper Endoscopy with biopsies.    DATE OF PROCEDURE: January 8, 2018.    REFERRING PROVIDER:  CARINA Clemons.     INSTRUMENT:  Olympus GIF H180 J video endoscope.       INDICATIONS OF THE PROCEDURE:  This is a 22-year-old white female with history of recurrent reflux, nausea, epigastric abdominal pain and intermittent dysphagia.  Currently undergoing upper endoscopy for further evaluation.       BIOPSIES: Second portion of duodenum.  Gastric antrum, angularis and body of the stomach.  Biopsies were obtained from the mid and distal esophagus     MEDICATIONS:  MAC.     PHOTOGRAPHS:  Photographs were included in the medical records.     CONSENT/PREPROCEDURE EVALUATION:  Risks, benefits, alternatives and options of the procedure including risks of anesthesia/sedation were discussed and informed consent was obtained prior to the procedure. History and physical examination were performed and nothing precluded the test.     REPORT:  The patient was placed in left lateral decubitus position. Once under the influence of IV sedation, the instrument was inserted into the mouth and esophagus was intubated under direct vision without difficulty.     Esophagus:  Z line was noted to be around 35 cm.  A small sliding hiatal hernia less than 3 cm was noted.  No Beaver's esophagus was seen.  Erythematous distal esophagitis was seen.  No stricture or Schatzki's ring was noted.  No concentric rings were seen.  Biopsies were obtained from the mid and distal esophagus.     Stomach:  Antrum:  Erythematous gastritis.  Angulus, lesser and greater curves: Normal.  Retroflex examination: Sliding hiatal hernia.  Cardia and fundus:  Normal.     Body of the stomach: Erythematous gastritis.  Good distensibility of the stomach was achieved no giant folds were noted.   Some bile tinged secretions were noted within the stomach.  There were suctioned.  Biopsies were obtained from the gastric antrum, angularis and body of the  stomach.    Pylorus and pyloric channel:  normal.     Duodenum:  Bulb: Normal.  Second portion: normal.  No scalloping was seen in the second portion of duodenum.  Biopsies were obtained from the second portion of duodenum.    Intervention:  None.       The upper GI tract was decompressed and the scope was pulled out of the patient. The patient tolerated the procedure well.     DIAGNOSES:     1. Erythematous distal esophagitis.  2. Small sliding hiatal hernia less than 3 cm.  3. Erythematous gastritis.  4. No Schatzki's ring or strictures were noted within the esophagus.  No concentric rings were seen.  Biopsies were obtained from the mid and distal esophagus.  The likely etiology of intermittent dysphagia is esophageal dysmotility.  5. Bile tinged secretions were seen within the stomach.  These were suctioned.  6. No active ulcer was noted.      RECOMMENDATIONS:  1.  Dietary instructions.  2.  Pantoprazole 40 mg 1 p.o. q.a.m. 1/2 hour before breakfast.  3.  Follow biopsies.  4.  Follow up in office in 1-2 weeks.  5.  Zofran 4 mg tablet.  1-to 3 times a day by mouth as needed for nausea.         Thank you very much for letting me participate in the care of this patient. Please do not hesitate to call me if you have any questions.

## 2018-01-08 NOTE — DISCHARGE INSTRUCTIONS
Rest today  No pushing,pulling,tugging,heavy lifting, or strenuous activity   No major decision making,driving,or drinking alcoholic beverages for 24 hours due to the sedation you received  Always use good hand hygiene/washing technique  No driving on pain medication.    To assist you in voiding:  Drink plenty of fluids  Listen to running water while attempting to void.    If you are unable to urinate and you have an uncomfortable urge to void or it has been   6 hours since you were discharged, return to the Emergency Room.    Postprocedure instructions.  Nothing by mouth until fully alert.  Once fully alert may have clear liquids.  Avoid soda beverages.  May advance diet as tolerated.  Bedrest until fully alert.  Follow-up precautions.    Diet:   Low fat diet.  Limit Coffee, Chocolate, Fried Foods,   Avoid Sodas,High Fat Foods, Cookies, Excessive Tomato or Onions, Alcohol and Smoking).    Blood Thinner Directions:  Avoid Aspirin & other NSAIDS for 7 days.  Tylenol is okay.    Treatments:    Other Instructions:  Call [facility] at [phone] or come to the Emergency Department if you experience the following: Chest pain, abdominal pain, bleeding (vomiting of blood or coffee colored material, black stools or jarvis blood in stools), fever/chills, nausea and vomiting or dizziness.      Follow-up:    DR. BRIANNA JIMENEZ in 1-2 weeks.Office phone # [phone].  If you already have an appointment keep the appointment.

## 2018-01-11 LAB
LAB AP CASE REPORT: NORMAL
Lab: NORMAL
PATH REPORT.FINAL DX SPEC: NORMAL

## 2018-01-18 RX ORDER — PANTOPRAZOLE SODIUM 40 MG/1
TABLET, DELAYED RELEASE ORAL
Qty: 60 TABLET | Refills: 0 | Status: SHIPPED | OUTPATIENT
Start: 2018-01-18 | End: 2018-02-27 | Stop reason: SDUPTHER

## 2018-01-24 ENCOUNTER — OFFICE VISIT (OUTPATIENT)
Dept: GASTROENTEROLOGY | Facility: CLINIC | Age: 23
End: 2018-01-24

## 2018-01-24 ENCOUNTER — LAB (OUTPATIENT)
Dept: LAB | Facility: HOSPITAL | Age: 23
End: 2018-01-24
Attending: INTERNAL MEDICINE

## 2018-01-24 VITALS
DIASTOLIC BLOOD PRESSURE: 52 MMHG | TEMPERATURE: 97.3 F | SYSTOLIC BLOOD PRESSURE: 83 MMHG | HEART RATE: 55 BPM | BODY MASS INDEX: 16.56 KG/M2 | HEIGHT: 62 IN | WEIGHT: 90 LBS | RESPIRATION RATE: 18 BRPM

## 2018-01-24 DIAGNOSIS — IMO0001 REGURGITATION: ICD-10-CM

## 2018-01-24 DIAGNOSIS — R13.10 DYSPHAGIA, UNSPECIFIED TYPE: ICD-10-CM

## 2018-01-24 DIAGNOSIS — R12 HEARTBURN: ICD-10-CM

## 2018-01-24 DIAGNOSIS — R10.13 EPIGASTRIC PAIN: Primary | ICD-10-CM

## 2018-01-24 DIAGNOSIS — K59.00 CONSTIPATION, UNSPECIFIED CONSTIPATION TYPE: ICD-10-CM

## 2018-01-24 DIAGNOSIS — R10.13 EPIGASTRIC PAIN: ICD-10-CM

## 2018-01-24 LAB
ALBUMIN SERPL-MCNC: 4.6 G/DL (ref 3.5–5)
ALBUMIN/GLOB SERPL: 1.5 G/DL (ref 1–2)
ALP SERPL-CCNC: 109 U/L (ref 38–126)
ALT SERPL W P-5'-P-CCNC: 47 U/L (ref 13–69)
ANION GAP SERPL CALCULATED.3IONS-SCNC: 13 MMOL/L
AST SERPL-CCNC: 37 U/L (ref 15–46)
BILIRUB SERPL-MCNC: 0.4 MG/DL (ref 0.2–1.3)
BUN BLD-MCNC: 6 MG/DL (ref 7–20)
BUN/CREAT SERPL: 10 (ref 7.1–23.5)
CALCIUM SPEC-SCNC: 9.2 MG/DL (ref 8.4–10.2)
CHLORIDE SERPL-SCNC: 102 MMOL/L (ref 98–107)
CO2 SERPL-SCNC: 30 MMOL/L (ref 26–30)
CREAT BLD-MCNC: 0.6 MG/DL (ref 0.6–1.3)
DEPRECATED RDW RBC AUTO: 40.1 FL (ref 37–54)
ERYTHROCYTE [DISTWIDTH] IN BLOOD BY AUTOMATED COUNT: 11.8 % (ref 11.5–14.5)
GFR SERPL CREATININE-BSD FRML MDRD: 127 ML/MIN/1.73
GFR SERPL CREATININE-BSD FRML MDRD: >150 ML/MIN/1.73
GLOBULIN UR ELPH-MCNC: 3 GM/DL
GLUCOSE BLD-MCNC: 88 MG/DL (ref 74–98)
HCT VFR BLD AUTO: 39.2 % (ref 37–47)
HGB BLD-MCNC: 13 G/DL (ref 12–16)
MCH RBC QN AUTO: 31.1 PG (ref 27–31)
MCHC RBC AUTO-ENTMCNC: 33.2 G/DL (ref 30–37)
MCV RBC AUTO: 93.8 FL (ref 81–99)
PLATELET # BLD AUTO: 248 10*3/MM3 (ref 130–400)
PMV BLD AUTO: 10.3 FL (ref 6–12)
POTASSIUM BLD-SCNC: 4 MMOL/L (ref 3.5–5.1)
PROT SERPL-MCNC: 7.6 G/DL (ref 6.3–8.2)
RBC # BLD AUTO: 4.18 10*6/MM3 (ref 4.2–5.4)
SODIUM BLD-SCNC: 141 MMOL/L (ref 137–145)
WBC NRBC COR # BLD: 3.5 10*3/MM3 (ref 4.8–10.8)

## 2018-01-24 PROCEDURE — 80053 COMPREHEN METABOLIC PANEL: CPT

## 2018-01-24 PROCEDURE — 85027 COMPLETE CBC AUTOMATED: CPT

## 2018-01-24 PROCEDURE — 36415 COLL VENOUS BLD VENIPUNCTURE: CPT

## 2018-01-24 PROCEDURE — 83516 IMMUNOASSAY NONANTIBODY: CPT

## 2018-01-24 PROCEDURE — 99214 OFFICE O/P EST MOD 30 MIN: CPT | Performed by: INTERNAL MEDICINE

## 2018-01-24 PROCEDURE — 82784 ASSAY IGA/IGD/IGG/IGM EACH: CPT

## 2018-01-24 RX ORDER — METOCLOPRAMIDE 5 MG/1
2.5 TABLET ORAL
Qty: 30 TABLET | Refills: 0 | Status: SHIPPED | OUTPATIENT
Start: 2018-01-24 | End: 2018-02-27 | Stop reason: SDUPTHER

## 2018-01-24 NOTE — PROGRESS NOTES
Chief Complaint   Patient presents with   • Abdominal Pain   • Nausea   • Difficulty Swallowing   • Heartburn   • Constipation       History of Present Illness     There is history of epigastric abdominal pain off and on for the last 2 years.  The pain is gradual in onset, moderate in severity and aching in character.  Frequency being 2-3 times a week.  The pain may last for several minutes.  There is no radiation of abdominal pain.  Eating worsens the abdominal pain.  No definite relieving factors of abdominal pain.     The patient has a history of reflux off and on for the last 4 years.  The reflux is rather severe.  Symptoms are described as retrosternal burning sensation, and indigestion.  There is history of significant regurgitative symptoms.  Frequency being several times per week.  The symptoms are worse at night.  The patient takes acid suppressive therapy with significant breakthrough symptoms.  The patient has difficulty swallowing off and for the last . The symptom is moderate in severity, occurs occasionally perhaps once or twice a week and is mostly associated with solid foods.  The symptoms are not progressive.  The patient points towards the lower substernal area.  There is no associated weight loss.    The patient has history of recurrent nausea for the last several months.  The nausea is described as mild to moderate in severity, frequency being a several times per week.  Nauseous feeling may last for several minutes.  Eating worsens the symptom however no definite relieving factors of nausea.  There is occasional associated vomiting.     The patient has a history of rather severe constipation off and on for the last 6 months. . This is described as hard stools perhaps one bowel movement once a week. The patient takes frequent laxatives to have a bowel movement. There is no associated rectal pain. Episodes of constipation are followed by episodes of diarrhea which occurred perhaps once a month or  "so.  During the diarrheal episodes the patient may have 3-4 bowel movements a day. The diarrhea may last for a day or so. There is no history of overt GI bleed (Hematemesis, melena or hematochezia). There is no history of liver disease or pancreatitis in the past.    In 2014 the patient claims that she started having these symptoms. She was diagnosed to have \"H pylori. The patient has been treated for the same. However her symptoms never improved.      Review of Systems   Constitutional: Positive for fatigue. Negative for appetite change, chills, fever and unexpected weight change.   HENT: Positive for trouble swallowing. Negative for mouth sores and nosebleeds.    Eyes: Negative for discharge and redness.   Respiratory: Positive for choking. Negative for apnea, cough and shortness of breath.    Cardiovascular: Positive for palpitations. Negative for chest pain and leg swelling.   Gastrointestinal: Positive for abdominal pain and constipation. Negative for abdominal distention, anal bleeding, blood in stool, diarrhea, nausea and vomiting.   Endocrine: Negative for cold intolerance, heat intolerance and polydipsia.   Genitourinary: Negative for dysuria, hematuria and urgency.   Musculoskeletal: Negative for arthralgias, joint swelling and myalgias.   Skin: Negative for rash.   Allergic/Immunologic: Positive for food allergies. Negative for immunocompromised state.   Neurological: Negative for dizziness, seizures, syncope and headaches.   Hematological: Negative for adenopathy. Does not bruise/bleed easily.   Psychiatric/Behavioral: Negative for dysphoric mood. The patient is not nervous/anxious and is not hyperactive.      Patient Active Problem List   Diagnosis   • Asthma   • Chronic GERD   • Fatigue   • Mild persistent asthma without complication   • Left lower quadrant pain   • Constipation   • Diarrhea   • Epigastric pain   • Heartburn   • Dysphagia   • Nausea   • Colon cancer screening     Past Medical History: "   Diagnosis Date   • Asthma, extrinsic    • B12 deficiency    • Bronchiectasis    • Diverticulitis of colon     Treated with antibiotics   • GERD (gastroesophageal reflux disease)    • IBS (irritable bowel syndrome)    • Lactose intolerance    • Migraines    • Problems with swallowing    • Wears glasses     TO READ     Past Surgical History:   Procedure Laterality Date   • ENDOSCOPY N/A 2018    Procedure: ESOPHAGOGASTRODUODENOSCOPY with biopsies;  Surgeon: Esau Robertson MD;  Location: [facility] ENDOSCOPY;  Service:    • NO PAST SURGERIES     • UPPER GASTROINTESTINAL ENDOSCOPY       Family History   Problem Relation Age of Onset   • Irritable bowel syndrome Mother    • No Known Problems Father    • Colon cancer Maternal Uncle    • Stomach cancer Maternal Grandfather         • Colon cancer Maternal Uncle           Social History   Substance Use Topics   • Smoking status: Never Smoker   • Smokeless tobacco: Never Used      Comment: Never smoked   • Alcohol use No       Current Outpatient Prescriptions:   •  albuterol (PROVENTIL HFA;VENTOLIN HFA) 108 (90 BASE) MCG/ACT inhaler, Inhale 2 puffs Every 6 (Six) Hours As Needed for Wheezing or Shortness of Air., Disp: 1 inhaler, Rfl: 2  •  Cetirizine HCl 10 MG capsule, Take 10 mg by mouth Every Night., Disp: 30 capsule, Rfl: 3  •  Cyanocobalamin (VITAMIN B-12 IJ), Inject 1 dose as directed Every 7 (Seven) Days., Disp: , Rfl:   •  flunisolide (NASALIDE) 25 MCG/ACT (0.025%) solution nasal spray, Inhale 1 spray Every 12 (Twelve) Hours., Disp: 1 bottle, Rfl: 5  •  fluticasone-salmeterol (ADVAIR) 500-50 MCG/DOSE DISKUS, Inhale 1 puff 2 (Two) Times a Day., Disp: 60 each, Rfl: 3  •  montelukast (SINGULAIR) 10 MG tablet, Take 1 tablet by mouth Every Night., Disp: 30 tablet, Rfl: 3  •  pantoprazole (PROTONIX) 40 MG EC tablet, TAKE ONE TABLET BY MOUTH 2 TIMES A DAY, Disp: 60 tablet, Rfl: 0  •  Tiotropium Bromide Monohydrate (SPIRIVA  "RESPIMAT) 1.25 MCG/ACT aerosol solution, Inhale 2 puffs Daily., Disp: 1 inhaler, Rfl: 0  •  metoclopramide (REGLAN) 5 MG tablet, Take 0.5 tablets by mouth 2 (Two) Times a Day Before Meals., Disp: 30 tablet, Rfl: 0    Allergies   Allergen Reactions   • Ibuprofen Itching and Rash       Blood pressure (!) 83/52, pulse 55, temperature 97.3 °F (36.3 °C), resp. rate 18, height 157.5 cm (62.01\"), weight 40.8 kg (90 lb), last menstrual period 01/18/2018.    Physical Exam   Constitutional: She is oriented to person, place, and time. She appears well-developed and well-nourished. No distress.   HENT:   Head: Normocephalic and atraumatic.   Right Ear: Hearing and external ear normal.   Left Ear: Hearing and external ear normal.   Nose: Nose normal.   Mouth/Throat: Oropharynx is clear and moist and mucous membranes are normal. Mucous membranes are not pale, not dry and not cyanotic. No oral lesions. No oropharyngeal exudate.   Eyes: Conjunctivae and EOM are normal. Right eye exhibits no discharge. Left eye exhibits no discharge. No scleral icterus.   Neck: Trachea normal. Neck supple. No JVD present. No edema present. No thyroid mass and no thyromegaly present.   Cardiovascular: Normal rate, regular rhythm, S2 normal and normal heart sounds.  Exam reveals no gallop, no S3 and no friction rub.    No murmur heard.  Pulmonary/Chest: Effort normal and breath sounds normal. No respiratory distress. She has no wheezes. She has no rales. She exhibits no tenderness.   Abdominal: Soft. Normal appearance and bowel sounds are normal. She exhibits no distension, no ascites and no mass. There is no splenomegaly or hepatomegaly. There is no tenderness. There is no rigidity, no rebound and no guarding. No hernia.   Musculoskeletal: She exhibits no tenderness or deformity.       Vascular Status -  Her exam exhibits no right foot edema. Her exam exhibits no left foot edema.  Lymphadenopathy:     She has no cervical adenopathy.        Left: No " supraclavicular adenopathy present.   Neurological: She is alert and oriented to person, place, and time. She has normal strength. No cranial nerve deficit or sensory deficit. She exhibits normal muscle tone. Coordination normal.   Skin: No rash noted. She is not diaphoretic. No cyanosis. No pallor. Nails show no clubbing.   Psychiatric: She has a normal mood and affect. Her behavior is normal. Judgment and thought content normal.   Nursing note and vitals reviewed.    Stigmata of chronic liver disease:  None.  Asterixis:  None.      Laboratory Testing:  Upon review of medical records:      Dated 5/25/2017 WBC 4.35 hemoglobin 13.4 hematocrit 41.0 place a count 245 MCV 98.6    Procedures:  Upon review of medical records:      Dated January 8, 2018 patient underwent an upper endoscopy which revealed: Erythematous distal esophagitis.  Small sliding hiatal hernia less than 3 cm.  Erythematous gastritis.  No Schatzki’s ring or strictures were noted within the esophagus.  No concentric rings were seen.  Biopsies were obtained from the mid and distal esophagus.  The likely etiology of intermittent dysphasia is esophageal dysmotility.  Bile tinged secretions were seen within the stomach.  These were suctioned.  No active ulcer was noted.  Second portion of duodenum, biopsies revealed no pathologic alterations.  Negative for celiac disease, microorganisms, metaplasia or atypia.  Antrum, body, angulus biopsies revealed mild chronic gastritis.  Negative for H. pylori, metaplasia, dysplasia or malignancy.  Distal and midesophagus, biopsies revealed reactive squamous mucosa (no eosinophils identified).  Negative for metaplasia, dysplasia or malignancy.    Assessment and Plan:      Jocelyne was seen today for abdominal pain, nausea, difficulty swallowing, heartburn and constipation.    Diagnoses and all orders for this visit:    Epigastric pain  Comments:  Possible underlying pancreatobiliary disease.  Orders:  -     CBC (No  Diff); Future  -     Comprehensive Metabolic Panel; Future  -     Tissue Transglutaminase, IgA; Future  -     IgA; Future  -     US Abdomen Limited; Future    Heartburn  Comments:  Significant reflux.  Orders:  -     CBC (No Diff); Future  -     Comprehensive Metabolic Panel; Future  -     Tissue Transglutaminase, IgA; Future  -     US Abdomen Limited; Future    Dysphagia, unspecified type  Comments:  Likely secondary to dysmotility.  Orders:  -     CBC (No Diff); Future  -     Comprehensive Metabolic Panel; Future  -     Tissue Transglutaminase, IgA; Future  -     US Abdomen Limited; Future    Regurgitation  Comments:  Likely secondary to underlying gastrointestinal nonspecific dysmotility.  Orders:  -     metoclopramide (REGLAN) 5 MG tablet; Take 0.5 tablets by mouth 2 (Two) Times a Day Before Meals.  -     CBC (No Diff); Future  -     Comprehensive Metabolic Panel; Future  -     Tissue Transglutaminase, IgA; Future  -     IgA; Future  -     US Abdomen Limited; Future    Constipation, unspecified constipation type  Comments:  Possible postinfectious IBS type picture.          Plan  and Patient Instructions:    Patient Instructions   1. Antireflux measures.  2. Low-fat diet.  3. Small frequent meals.  4. Pantoprazole 40 mg 1 by mouth every morning one half hour before breakfast.  5. Short course of low-dose Reglan. Reglan (metoclopramide) 5 mg tablets.  Take one half tablet (2.5 mg) by mouth in the morning and in the evening (2 times daily preferably half an hour before food). Both short and long-term side effects were discussed with the patient.  6. CMP, TSH, TTG IgA and IgA quantitative.  7. RUQ US: Gallbladder, CBD size, pancreas, and liver.  8. Follow-up in 2-3 weeks.  9. Discussed with the patient in detail. Opportunity was given to ask questions.        Esau Robertson MD

## 2018-01-24 NOTE — PATIENT INSTRUCTIONS
1. Antireflux measures.  2. Low-fat diet.  3. Small frequent meals.  4. Pantoprazole 40 mg 1 by mouth every morning one half hour before breakfast.  5. Short course of low-dose Reglan. Reglan (metoclopramide) 5 mg tablets.  Take one half tablet (2.5 mg) by mouth in the morning and in the evening (2 times daily preferably half an hour before food). Both short and long-term side effects were discussed with the patient.  6. CMP, TSH, TTG IgA and IgA quantitative.  7. RUQ US: Gallbladder, CBD size, pancreas, and liver.  8. Follow-up in 2-3 weeks.  9. Discussed with the patient in detail. Opportunity was given to ask questions.

## 2018-01-26 LAB
IGA SERPL-MCNC: 222 MG/DL (ref 87–352)
TTG IGA SER-ACNC: <2 U/ML (ref 0–3)

## 2018-01-30 ENCOUNTER — OFFICE VISIT (OUTPATIENT)
Dept: PULMONOLOGY | Facility: CLINIC | Age: 23
End: 2018-01-30

## 2018-01-30 ENCOUNTER — HOSPITAL ENCOUNTER (OUTPATIENT)
Dept: ULTRASOUND IMAGING | Facility: HOSPITAL | Age: 23
Discharge: HOME | End: 2018-01-30
Attending: INTERNAL MEDICINE | Admitting: INTERNAL MEDICINE

## 2018-01-30 VITALS
HEIGHT: 62 IN | RESPIRATION RATE: 16 BRPM | HEART RATE: 60 BPM | OXYGEN SATURATION: 98 % | BODY MASS INDEX: 16.56 KG/M2 | SYSTOLIC BLOOD PRESSURE: 98 MMHG | WEIGHT: 90 LBS | DIASTOLIC BLOOD PRESSURE: 64 MMHG

## 2018-01-30 DIAGNOSIS — J45.40 MODERATE PERSISTENT ASTHMA WITHOUT COMPLICATION: Primary | ICD-10-CM

## 2018-01-30 DIAGNOSIS — R10.13 EPIGASTRIC PAIN: ICD-10-CM

## 2018-01-30 DIAGNOSIS — R12 HEARTBURN: ICD-10-CM

## 2018-01-30 DIAGNOSIS — J30.89 OTHER ALLERGIC RHINITIS: ICD-10-CM

## 2018-01-30 DIAGNOSIS — IMO0001 REGURGITATION: ICD-10-CM

## 2018-01-30 DIAGNOSIS — R13.10 DYSPHAGIA, UNSPECIFIED TYPE: ICD-10-CM

## 2018-01-30 DIAGNOSIS — R06.02 SHORTNESS OF BREATH: ICD-10-CM

## 2018-01-30 PROCEDURE — 76705 ECHO EXAM OF ABDOMEN: CPT

## 2018-01-30 PROCEDURE — 99214 OFFICE O/P EST MOD 30 MIN: CPT | Performed by: NURSE PRACTITIONER

## 2018-01-30 RX ORDER — ALBUTEROL SULFATE 90 UG/1
2 AEROSOL, METERED RESPIRATORY (INHALATION) EVERY 6 HOURS PRN
Qty: 1 INHALER | Refills: 2 | Status: SHIPPED | OUTPATIENT
Start: 2018-01-30 | End: 2018-08-15 | Stop reason: SDUPTHER

## 2018-01-30 RX ORDER — MONTELUKAST SODIUM 10 MG/1
10 TABLET ORAL NIGHTLY
Qty: 30 TABLET | Refills: 3 | Status: SHIPPED | OUTPATIENT
Start: 2018-01-30 | End: 2018-08-15 | Stop reason: SDUPTHER

## 2018-01-30 RX ORDER — FLUNISOLIDE 0.25 MG/ML
1 SOLUTION NASAL EVERY 12 HOURS
Qty: 1 BOTTLE | Refills: 5 | Status: SHIPPED | OUTPATIENT
Start: 2018-01-30 | End: 2018-08-15 | Stop reason: SDUPTHER

## 2018-02-27 ENCOUNTER — OFFICE VISIT (OUTPATIENT)
Dept: GASTROENTEROLOGY | Facility: CLINIC | Age: 23
End: 2018-02-27

## 2018-02-27 ENCOUNTER — OFFICE VISIT (OUTPATIENT)
Dept: PULMONOLOGY | Facility: CLINIC | Age: 23
End: 2018-02-27

## 2018-02-27 ENCOUNTER — APPOINTMENT (OUTPATIENT)
Dept: PREADMISSION TESTING | Facility: HOSPITAL | Age: 23
End: 2018-02-27

## 2018-02-27 VITALS
SYSTOLIC BLOOD PRESSURE: 80 MMHG | RESPIRATION RATE: 14 BRPM | BODY MASS INDEX: 16.38 KG/M2 | WEIGHT: 89 LBS | HEART RATE: 55 BPM | OXYGEN SATURATION: 99 % | DIASTOLIC BLOOD PRESSURE: 52 MMHG | HEIGHT: 62 IN

## 2018-02-27 VITALS
SYSTOLIC BLOOD PRESSURE: 97 MMHG | BODY MASS INDEX: 16.56 KG/M2 | TEMPERATURE: 97.7 F | HEIGHT: 62 IN | WEIGHT: 90 LBS | HEART RATE: 56 BPM | RESPIRATION RATE: 15 BRPM | DIASTOLIC BLOOD PRESSURE: 47 MMHG

## 2018-02-27 VITALS — BODY MASS INDEX: 16.56 KG/M2 | HEIGHT: 62 IN | WEIGHT: 90 LBS

## 2018-02-27 DIAGNOSIS — R10.33 PERIUMBILICAL ABDOMINAL PAIN: ICD-10-CM

## 2018-02-27 DIAGNOSIS — K59.00 CONSTIPATION, UNSPECIFIED CONSTIPATION TYPE: ICD-10-CM

## 2018-02-27 DIAGNOSIS — J30.89 OTHER ALLERGIC RHINITIS: ICD-10-CM

## 2018-02-27 DIAGNOSIS — R12 HEARTBURN: ICD-10-CM

## 2018-02-27 DIAGNOSIS — R06.02 SHORTNESS OF BREATH: Primary | ICD-10-CM

## 2018-02-27 DIAGNOSIS — R11.0 NAUSEA: Primary | ICD-10-CM

## 2018-02-27 DIAGNOSIS — J45.40 MODERATE PERSISTENT ASTHMA WITHOUT COMPLICATION: ICD-10-CM

## 2018-02-27 LAB — TSH SERPL DL<=0.05 MIU/L-ACNC: 1.23 MIU/ML (ref 0.47–4.68)

## 2018-02-27 PROCEDURE — 36415 COLL VENOUS BLD VENIPUNCTURE: CPT

## 2018-02-27 PROCEDURE — 99214 OFFICE O/P EST MOD 30 MIN: CPT | Performed by: INTERNAL MEDICINE

## 2018-02-27 PROCEDURE — 99213 OFFICE O/P EST LOW 20 MIN: CPT | Performed by: INTERNAL MEDICINE

## 2018-02-27 PROCEDURE — 84443 ASSAY THYROID STIM HORMONE: CPT | Performed by: INTERNAL MEDICINE

## 2018-02-27 RX ORDER — METOCLOPRAMIDE 5 MG/1
2.5 TABLET ORAL
Qty: 60 TABLET | Refills: 1 | Status: SHIPPED | OUTPATIENT
Start: 2018-02-27 | End: 2018-05-03 | Stop reason: SDUPTHER

## 2018-02-27 RX ORDER — PANTOPRAZOLE SODIUM 40 MG/1
40 TABLET, DELAYED RELEASE ORAL DAILY
Qty: 30 TABLET | Refills: 3 | Status: SHIPPED | OUTPATIENT
Start: 2018-02-27 | End: 2018-06-13 | Stop reason: SDUPTHER

## 2018-02-27 NOTE — PROGRESS NOTES
"Chief Complaint   Patient presents with   • Follow-up   • Shortness of Breath         Subjective   Jocelyne Branch is a 22 y.o. female.     History of Present Illness   Patient comes in today to follow-up on asthma.    The patient says that she's had 2-3 episodes where she coughed up dark greenish to blackish thick sputum on occasion.  The patient says that when it happens it only lasts for about 1 or 2 episodes and then it never recurs for a few weeks.  The patient is actually noticed that these events are happening less and less frequently since she's been on the medications.    She is also using her nasal spray without any significant issues.    The following portions of the patient's history were reviewed and updated as appropriate: allergies, current medications, past family history, past medical history, past social history and past surgical history.    Review of Systems   Constitutional: Positive for fatigue. Negative for chills and fever.   HENT: Positive for postnasal drip, rhinorrhea, sinus pressure, sneezing and sore throat.    Respiratory: Positive for cough and shortness of breath. Negative for chest tightness and wheezing.    Psychiatric/Behavioral: Positive for sleep disturbance.       Objective   Visit Vitals   • BP (!) 80/52   • Pulse 55   • Resp 14   • Ht 157.5 cm (62.01\")   • Wt 40.4 kg (89 lb)   • SpO2 99%   • BMI 16.27 kg/m2       Physical Exam   Constitutional: She is oriented to person, place, and time. She appears well-developed and well-nourished.   HENT:   Sinuses were non tender on palpation.  Nostril showed mild erythema.  Oropharynx was cobblestoned.   Eyes: EOM are normal.   Neck: Neck supple. No JVD present.   Cardiovascular: Normal rate and regular rhythm.    Pulmonary/Chest: Effort normal and breath sounds normal.   Musculoskeletal:   Gait was normal.   Neurological: She is alert and oriented to person, place, and time.   Skin: Skin is warm and dry.   Vitals " "reviewed.        Assessment/Plan   Jocelyne was seen today for follow-up and shortness of breath.    Diagnoses and all orders for this visit:    Shortness of breath    Moderate persistent asthma without complication    Other allergic rhinitis         Return in about 3 months (around 5/27/2018) for Recheck, For Rosa.    DISCUSSION (if any):  I told the patient that if she has another spell with \"coughing up dark greenish to yellowish thick sputum\" to think about any activities, exposure to chemicals or different foods that she might have eaten the day before.    The patient's asthma and allergic rhinitis are under very good control and I made no changes.      Dictated utilizing Dragon dictation.    This document was electronically signed by Virginia Nicholson MD February 27, 2018  9:59 AM   "

## 2018-02-27 NOTE — PATIENT INSTRUCTIONS
1. Low-fat diet.  2. Pantoprazole 40 mg 1 by mouth every morning one half hour before breakfast.  3. Reglan 2.5 mg 1 by mouth 3-4 times a day half hour before food.  Discussed with the patient in detail. Side effects were discussed as well.  4. Linzess capsule 145 µg. Take 1 capsule on empty stomach in the morning. Samples given.  5. Check TSH.  6. [facility]CK HIDA scan for gallbladder ejection fraction.  7. The patient has been scheduled for colonoscopy in March 2018.  8. Discussed with the patient in detail. Opportunity was given to ask questions.

## 2018-02-27 NOTE — PROGRESS NOTES
Chief Complaint   Patient presents with   • Nausea   • GI Problem       History of Present Illness     The patient has history of recurrent nausea for the last several months.  The nausea is described as mild to moderate in severity, frequency being a several times per week.  Nauseous feeling may last for several minutes.  Eating worsens the symptom however no definite relieving factors of nausea.  There is occasional associated vomiting. The patient took low-dose Reglan with significant improvement of her symptoms initially. However lately although the patient continues to take Reglan her symptoms have recurred.    There is history of epigastric abdominal pain off and on for the last 2 years.  The pain is gradual in onset, moderate in severity and aching in character.  Frequency being 2-3 times a week.  The pain may last for several minutes.  There is no radiation of abdominal pain.  Eating worsens the abdominal pain.  No definite relieving factors of abdominal pain.      The patient has a history of reflux off and on for the last 4 years.  The reflux is rather severe.  Symptoms are described as retrosternal burning sensation, and indigestion.  There is history of significant regurgitative symptoms.  Frequency being several times per week.  The symptoms are worse at night.  The patient takes acid suppressive therapy with significant breakthrough symptoms.      The patient has difficulty swallowing off and for the last 3 years . The symptom is moderate in severity, occurs occasionally perhaps once or twice a week and is mostly associated with solid foods.  The symptoms are not progressive.  The patient points towards the lower substernal area.  There is no associated weight loss.       The patient has a history of rather severe constipation off and on for the last 6 months. . This is described as hard stools perhaps one bowel movement once a week. The patient takes frequent laxatives to have a bowel movement. There  "is no associated rectal pain. Episodes of constipation are followed by episodes of diarrhea which occurred perhaps once a month or so.  During the diarrheal episodes the patient may have 3-4 bowel movements a day. The diarrhea may last for a day or so. There is no history of overt GI bleed (Hematemesis, melena or hematochezia). There is no history of liver disease or pancreatitis in the past.     In 2014 the patient claims that she started having these symptoms. She was diagnosed to have \"H pylori. The patient has been treated for the same. However her symptoms never improved. [place] She denies psychosocial issues. The patient is not home sick.       Review of Systems   Constitutional: Positive for appetite change and fatigue. Negative for chills, fever and unexpected weight change.   HENT: Positive for trouble swallowing. Negative for mouth sores and nosebleeds.    Eyes: Negative for discharge and redness.   Respiratory: Negative for apnea, cough and shortness of breath.    Cardiovascular: Positive for palpitations. Negative for chest pain and leg swelling.   Gastrointestinal: Positive for abdominal pain, constipation, diarrhea, nausea and vomiting. Negative for abdominal distention, anal bleeding and blood in stool.   Endocrine: Negative for cold intolerance, heat intolerance and polydipsia.   Genitourinary: Negative for dysuria, hematuria and urgency.   Musculoskeletal: Negative for arthralgias, joint swelling and myalgias.   Skin: Negative for rash.   Allergic/Immunologic: Negative for food allergies and immunocompromised state.   Neurological: Negative for dizziness, seizures, syncope and headaches.   Hematological: Negative for adenopathy. Does not bruise/bleed easily.   Psychiatric/Behavioral: Negative for dysphoric mood. The patient is not nervous/anxious and is not hyperactive.      Patient Active Problem List   Diagnosis   • Asthma   • Chronic GERD   • Fatigue   • Mild " persistent asthma without complication   • Left lower quadrant pain   • Constipation   • Diarrhea   • Epigastric pain   • Heartburn   • Dysphagia   • Nausea   • Colon cancer screening     Past Medical History:   Diagnosis Date   • Asthma, extrinsic    • B12 deficiency    • Bronchiectasis    • Diverticulitis of colon     Treated with antibiotics   • GERD (gastroesophageal reflux disease)    • History of being tatooed     FOREARMS   • IBS (irritable bowel syndrome)    • Lactose intolerance    • Migraines    • Problems with swallowing    • Wears glasses     TO READ     Past Surgical History:   Procedure Laterality Date   • ENDOSCOPY N/A 2018    Procedure: ESOPHAGOGASTRODUODENOSCOPY with biopsies;  Surgeon: Esau Robertson MD;  Location: [facility] ENDOSCOPY;  Service:    • NO PAST SURGERIES     • UPPER GASTROINTESTINAL ENDOSCOPY       Family History   Problem Relation Age of Onset   • Irritable bowel syndrome Mother    • No Known Problems Father    • Colon cancer Maternal Uncle    • Stomach cancer Maternal Grandfather         • Colon cancer Maternal Uncle           Social History   Substance Use Topics   • Smoking status: Never Smoker   • Smokeless tobacco: Never Used      Comment: Never smoked   • Alcohol use No       Current Outpatient Prescriptions:   •  albuterol (PROVENTIL HFA;VENTOLIN HFA) 108 (90 Base) MCG/ACT inhaler, Inhale 2 puffs Every 6 (Six) Hours As Needed for Wheezing or Shortness of Air., Disp: 1 inhaler, Rfl: 2  •  Cetirizine HCl 10 MG capsule, Take 10 mg by mouth Every Night., Disp: 30 capsule, Rfl: 3  •  Cyanocobalamin (VITAMIN B-12 IJ), Inject 1 dose as directed Every 7 (Seven) Days., Disp: , Rfl:   •  flunisolide (NASALIDE) 25 MCG/ACT (0.025%) solution nasal spray, Inhale 1 spray Every 12 (Twelve) Hours., Disp: 1 bottle, Rfl: 5  •  fluticasone-salmeterol (ADVAIR) 500-50 MCG/DOSE DISKUS, Inhale 1 puff 2 (Two) Times a Day., Disp: 60 each, Rfl: 3  •   "montelukast (SINGULAIR) 10 MG tablet, Take 1 tablet by mouth Every Night., Disp: 30 tablet, Rfl: 3  •  pantoprazole (PROTONIX) 40 MG EC tablet, Take 1 tablet by mouth Daily. Half hour before breakfast, Disp: 30 tablet, Rfl: 3  •  Tiotropium Bromide Monohydrate (SPIRIVA RESPIMAT) 1.25 MCG/ACT aerosol solution inhaler, Inhale 2 puffs Daily., Disp: 1 inhaler, Rfl: 0  •  linaclotide (LINZESS) 145 MCG capsule capsule, Take 1 capsule by mouth Every Morning Before Breakfast., Disp: 4 capsule, Rfl: 0  •  metoclopramide (REGLAN) 5 MG tablet, Take 0.5 tablets by mouth 4 (Four) Times a Day Before Meals & at Bedtime., Disp: 60 tablet, Rfl: 1    Allergies   Allergen Reactions   • Flu Virus Vaccine Swelling   • Ibuprofen Itching and Rash       Blood pressure 97/47, pulse 56, temperature 97.7 °F (36.5 °C), resp. rate 15, height 157.5 cm (62.01\"), weight 40.8 kg (90 lb), last menstrual period 02/20/2018, not currently breastfeeding.    Physical Exam   Constitutional: She is oriented to person, place, and time. She appears well-developed and well-nourished. No distress.   HENT:   Head: Normocephalic and atraumatic.   Right Ear: Hearing and external ear normal.   Left Ear: Hearing and external ear normal.   Nose: Nose normal.   Mouth/Throat: Oropharynx is clear and moist and mucous membranes are normal. Mucous membranes are not pale, not dry and not cyanotic. No oral lesions. No oropharyngeal exudate.   Eyes: Conjunctivae and EOM are normal. Right eye exhibits no discharge. Left eye exhibits no discharge. No scleral icterus.   Neck: Trachea normal. Neck supple. No JVD present. No edema present. No thyroid mass and no thyromegaly present.   Cardiovascular: Normal rate, regular rhythm, S2 normal and normal heart sounds.  Exam reveals no gallop, no S3 and no friction rub.    No murmur heard.  Pulmonary/Chest: Effort normal and breath sounds normal. No respiratory distress. She has no wheezes. She has no rales. She exhibits no " tenderness.   Abdominal: Soft. Normal appearance and bowel sounds are normal. She exhibits no distension, no ascites and no mass. There is no splenomegaly or hepatomegaly. There is tenderness (mild) in the right upper quadrant, epigastric area and periumbilical area. There is no rigidity, no rebound and no guarding. No hernia.   Musculoskeletal: She exhibits no tenderness or deformity.       Vascular Status -  Her exam exhibits no right foot edema. Her exam exhibits no left foot edema.  Lymphadenopathy:     She has no cervical adenopathy.        Left: No supraclavicular adenopathy present.   Neurological: She is alert and oriented to person, place, and time. She has normal strength. No cranial nerve deficit or sensory deficit. She exhibits normal muscle tone. Coordination normal.   Skin: No rash noted. She is not diaphoretic. No cyanosis. No pallor. Nails show no clubbing.   Psychiatric: She has a normal mood and affect. Her behavior is normal. Judgment and thought content normal.   Nursing note and vitals reviewed.    Stigmata of chronic liver disease:  None.  Asterixis:  None.    Laboratory Testing:  Upon review of medical records:      Dated April 11, 2016 white count 9.8 hemoglobin 10.9 hematocrit 33 and platelet count 344.  MCV 85.3.     Dated July 13, 2016 sodium 144 potassium 5.1 chloride 105 CO2 23 BUN 22 creatinine 1.4 and glucose 336.  Calcium 9.1 early 0.8 AST 52 ALT 40 albumen 4.3 and alkaline phosphatase 111.  Lipase 126.  White count 9.8 hemoglobin 11.4 hematocrit 34 and a platelet count 312.  MCV 88.7.     Dated March 13, 2017 albumin 3.80.  T bili 0.5 AST 48 ALT 33 alkaline phosphatase 169.  Total cholesterol 98.  Triglycerides 111.  TSH 1.508.  C-reactive protein 32.30.  Vitamin B12 level 498.  Serum iron 43.  TIBC 248.  Iron saturation 17%.  Ferritin 139.00.  WBC 8.38 hemoglobin 11.2 hematocrit 34.9 MCV 87.0 platelet count 267.     Dated August 5, 2017 sodium 140 potassium 4.5 chloride 100 CO2 26  BUN 18 serum creatinine 1.00 glucose 171.  Calcium 8.8.  Albumin 4.20.  T bili 0.9.  AST 81 ALT 67 alkaline phosphatase 181.  Lipase 136.  WBC 12.33 hemoglobin 12.1 hematocrit 36.3 MCV 86.4 platelet count 275.     Dated August 11, 2017 sodium 138 potassium 4.0 chloride 107 CO2 19 BUN 8 serum creatinine 0.90 glucose 296.  Calcium 8.4.  WBC 7.60 hemoglobin 10.2 hematocrit 30.8 MCV 86.8 and platelet count 174.     Dated 8/29/2017 glucose 209 BUN 9 creatinine 1.0 sodium 137 potassium 4.8 chloride 103 CO2 22 calcium 8.3 albumin 4.0 ALT 73  alkaline phosphatase 139 total bilirubin 1.1 WBC 7.09 hemoglobin 10.4 hematocrit 31.8 platelet count 243 MCV 88.6 lipase 88 d-dimer 743 troponin <0.012 hepatitis A IgM: Negative, hepatitis be surface antigen: Negative, hepatitis B core IgM: Negative, hepatitis C viral antibody: 0.3     Dated 11/16/2017 glucose 225 BUN 13 creatinine 0.8 sodium 142 potassium 5.1 chloride 103 CO2 26 calcium 9.1 albumin 3.8 total bilirubin 0.4 alkaline phosphatase 247 AST 57 ALT 65 TSH 1.070 vitamin B12 405 vitamin D 37.2 EVON negati rheumatoid factor <10 sedimentation rate 55 CRP 2.8     Dated 12/6/2017 glucose 86 BUN 15 creatinine 1.2 sodium 141 potassium 4.7 chloride 105 CO2 23 calcium 9.2 albumin 4.2 ALT 75 AST 10 9:00 phosphatase 148 total bilirubin 0.5 WBC 11.14 hemoglobin 11.3 hematocrit 34.6 platelet count 261 MCV 90.1 lipase 110 CA 1 2510.5     Dated 12/19/2017 glucose 252 BUN 13 creatinine 1.0 sodium 141 potassium 4.4 chloride 108 CO2 22 calcium 8.7 albumin 3.9 ALT 34 AST 30 7:00 phosphatase 203 total bilirubin 1.0W BC 8.49 hemoglobin 10.2 hematocrit 31.4 platelet count 202 MCV 90.0 lipase 95     Abdominal imaging results:  Upon review of medical records:     Dated May 30, 2016 CT of abdomen and pelvis without contrast revealed: the lung bases demonstrate mild bibasilar atelectasis. There are LAD coronary artery calcifications. The heart is normal in size. There is limited images of the  liver are unremarkable. The patient is status post cholecystectomy. The spleen is normal. No adrenal masses identified. The aorta is normal in caliber. There is no significant free fluid or adenopathy. There is no nephrolithiasis or hydronephrosis. There is a small duodenal diverticulum. There is mild sigmoid diverticulosis without evidence of diverticulitis. There is constipation without evidence of obstruction. The appendix is normal. A tiny umbilical hernia is seen containing fat. There is a small amount of free fluid in the pelvis which may be physiologic. There is a possible nabothian cyst seen in the cervix. The uterus and adnexal structures are unremarkable. Urinary bladder is decompressed with mild wall thickening.     Dated September 7, 2016 the patient underwent a CT of the Abdomen and Pelvis without contrast which revealed:  The gallbladder has been removed.  The solid abdominal organs and ureters are normal.  The GI tract is unremarkable, including the appendix.  Mild diverticulosis.  There is a 7.0 left ovarian cyst.  The uterus, ovaries and urinary bladder are otherwise normal.     Dated November 4, 2016 the patient underwent a CT of the abdomen and pelvis without contrast which revealed: No renal or ureteral stone or hydronephrosis.  Bladder is decompressed with nonspecific wall thickening.  Liver, spleen, pancreas and adrenal glands appear normal.  The bowel gas pattern is nonobstructive.  Appendix appears normal.  Moderate stool is present.  Uterus is surgically absent.  Minimal free fluid in the pelvis with postsurgical fat stranding.  No encapsulated fluid flexion to suggest abscess.  Ovaries are not clearly distinguishable from adjacent unopacified bowel.     Dated January 27, 2017 the patient underwent a small bowel follow-through which revealed: The  film is unremarkable.  The transit time to colon is normal.  The mucosal fold pattern is normal.  Spot images of the terminal ileum are  unremarkable.  Normal small bowel follow through.       Dated March 27, 2017 the patient underwent a CT of the abdomen and pelvis without contrast which revealed: No renal or ureteral stone or hydronephrosis.  Bladder is normal in contour.  Liver, spleen, pancreas and adrenal glands appear normal.  The bowel gas pattern is nonobstructive.  No focal inflammatory changes are present.  No evidence for appendicitis.  Mild sigmoid diverticulosis without diverticulitis.     Dated July 9, 2017 the patient underwent a CT of the abdomen and pelvis without contrast which revealed: Gallbladder has been removed.  Solid abdominal organs and ureters are normal.  GI tract is unremarkable, including the appendix.  Uterus and ovaries are absent.  The   Urinary bladder is unremarkable.  No pelvic or abdominal adenopathy, ascites or significant osseous abnormality.     CT of abdomen and pelvis with contrast dated 8/29/2017 reveals bilateral lower lobe atelectasis.  The liver is low-attenuation consistent with fatty infiltration.  The spleen, pancreas, adrenal glands and kidneys are unremarkable.  The bowel gas pattern is nonobstructive.  There is no wall thickening or mesenteric inflammatory stranding.  The appendix appears normal.  There is diverticulosis without diverticulitis.  The bladder is normal in contour.  There is no free fluid, free air or adenopathy.     Procedures:    Upon review of medical records:     Dated August 1, 2016 colonoscopy to terminal ileum revealed left-sided diverticulosis, colon polyps, internal and small external hemorrhoids.  Biopsies from the colon polyps revealed tubular adenomata.  No endoscopic evidence of ileitis or colitis was seen.  Random biopsies from the colon including rectum were negative for microscopic colitis.     Dated August 2, 2016 upper endoscopy revealed: Erythematous distal esophagitis, erythematous gastritis, gastric polyps and Changes consistent with Gastroparesis-delayed gastric  emptying (partly digested food residue within the stomach, patient nothing by mouth for 10 hours, and no gastric outlet obstruction).  Biopsies from the second portion of duodenum were negative for celiac disease.  Gastric biopsies were negative for Helicobacter pylori.  Gastric polyp biopsies did not reveal adenomatous change.     Dated August 9, 2017 the patient underwent an upper endoscopy which revealed: Distal esophagitis.  Erythematous, and 0.5 cm clean-based ulcer in the distal esophagus including erythema on the posterior wall.  Although, the patient is a typical the changes may represent pill esophagitis.  Small sliding hiatal hernia less than 3 cm.  Significant tortuosity and tertiary contractions of esophageal body.  Erythematous gastritis.  Gastric polyps.  No candidiasis was seen.  Changes seen within the esophagus are not consistent with herpes esophagitis.  Although, the patient is predisposed to CMV esophagitis in view of immunosuppression. However, the appearance of the distal esophageal ulcers not typical of CMV ulcer.    Dated December 22, 2017 the patient underwent an upper endoscopy which revealed: Erythematous distal esophagitis.  Erythematous-erosive gastritis.  Changes consistent with delayed gastric emptying (partly digested food residue within the stomach, patient nothing by mouth for 10 hours, and no gastric outlet obstruction).  Small sliding hiatal hernia less than 3 cm.  Gastric fundic polyp.  Antrum, biopsies revealed reactive gastropathy and regenerative changes suggesting a healing erosion.  Mild chronic inactive gastritis.  Negative for intestinal metaplasia, dysplasia or malignancy.  Helicobacter-like organisms not seen on routine stain.           Assessment and Plan:      Jocelyne was seen today for nausea and gi problem.    Diagnoses and all orders for this visit:    Nausea  Comments:  Possible underlying biliary dyskinesia.   Orders:  -     NM HIDA Scan With Pharmacological  Intervention; Future    Periumbilical abdominal pain  Comments:  Possible underlying biliary dyskinesia. History suggestive of postinfectious IBS type picture.  Orders:  -     NM HIDA Scan With Pharmacological Intervention; Future    Heartburn  Comments:  Significant reflux.  Orders:  -     pantoprazole (PROTONIX) 40 MG EC tablet; Take 1 tablet by mouth Daily. Half hour before breakfast    Constipation, unspecified constipation type  -     linaclotide (LINZESS) 145 MCG capsule capsule; Take 1 capsule by mouth Every Morning Before Breakfast.  -     TSH; Future    Other orders  -     metoclopramide (REGLAN) 5 MG tablet; Take 0.5 tablets by mouth 4 (Four) Times a Day Before Meals & at Bedtime.              Plan  and Patient Instructions:    Patient Instructions   1. Low-fat diet.  2. Pantoprazole 40 mg 1 by mouth every morning one half hour before breakfast.  3. Reglan 2.5 mg 1 by mouth 3-4 times a day half hour before food.  Discussed with the patient in detail. Side effects were discussed as well.  4. Linzess capsule 145 µg. Take 1 capsule on empty stomach in the morning. Samples given.  5. Check TSH.  6. [facility]CK HIDA scan for gallbladder ejection fraction.  7. The patient has been scheduled for colonoscopy in March 2018.  8. Discussed with the patient in detail. Opportunity was given to ask questions.        Esau Robertson MD

## 2018-03-05 ENCOUNTER — APPOINTMENT (OUTPATIENT)
Dept: NUCLEAR MEDICINE | Facility: HOSPITAL | Age: 23
End: 2018-03-05
Attending: INTERNAL MEDICINE

## 2018-03-05 ENCOUNTER — HOSPITAL ENCOUNTER (OUTPATIENT)
Dept: NUCLEAR MEDICINE | Facility: HOSPITAL | Age: 23
Discharge: HOME | End: 2018-03-05
Attending: INTERNAL MEDICINE

## 2018-03-05 DIAGNOSIS — R11.0 NAUSEA: ICD-10-CM

## 2018-03-05 DIAGNOSIS — R10.33 PERIUMBILICAL ABDOMINAL PAIN: ICD-10-CM

## 2018-03-05 PROCEDURE — 0 TECHNETIUM TC 99M MEBROFENIN KIT: Performed by: INTERNAL MEDICINE

## 2018-03-05 PROCEDURE — 25010000002 SINCALIDE PER 5 MCG: Performed by: INTERNAL MEDICINE

## 2018-03-05 PROCEDURE — 78227 HEPATOBIL SYST IMAGE W/DRUG: CPT

## 2018-03-05 PROCEDURE — A9537 TC99M MEBROFENIN: HCPCS | Performed by: INTERNAL MEDICINE

## 2018-03-05 RX ORDER — KIT FOR THE PREPARATION OF TECHNETIUM TC 99M MEBROFENIN 45 MG/10ML
1 INJECTION, POWDER, LYOPHILIZED, FOR SOLUTION INTRAVENOUS
Status: COMPLETED | OUTPATIENT
Start: 2018-03-05 | End: 2018-03-05

## 2018-03-05 RX ADMIN — MEBROFENIN 1 DOSE: 45 INJECTION, POWDER, LYOPHILIZED, FOR SOLUTION INTRAVENOUS at 12:15

## 2018-03-05 RX ADMIN — SINCALIDE 0.9 MCG: 5 INJECTION, POWDER, LYOPHILIZED, FOR SOLUTION INTRAVENOUS at 13:00

## 2018-03-07 ENCOUNTER — HOSPITAL ENCOUNTER (OUTPATIENT)
Facility: HOSPITAL | Age: 23
Setting detail: HOSPITAL OUTPATIENT SURGERY
Discharge: HOME | End: 2018-03-07
Attending: INTERNAL MEDICINE | Admitting: INTERNAL MEDICINE

## 2018-03-07 ENCOUNTER — ANESTHESIA (OUTPATIENT)
Dept: GASTROENTEROLOGY | Facility: HOSPITAL | Age: 23
End: 2018-03-07

## 2018-03-07 ENCOUNTER — ANESTHESIA EVENT (OUTPATIENT)
Dept: GASTROENTEROLOGY | Facility: HOSPITAL | Age: 23
End: 2018-03-07

## 2018-03-07 VITALS
OXYGEN SATURATION: 100 % | HEART RATE: 57 BPM | HEIGHT: 62 IN | RESPIRATION RATE: 18 BRPM | TEMPERATURE: 97.1 F | BODY MASS INDEX: 16.92 KG/M2 | SYSTOLIC BLOOD PRESSURE: 91 MMHG | WEIGHT: 91.93 LBS | DIASTOLIC BLOOD PRESSURE: 57 MMHG

## 2018-03-07 DIAGNOSIS — R19.7 DIARRHEA, UNSPECIFIED TYPE: ICD-10-CM

## 2018-03-07 DIAGNOSIS — Z12.11 COLON CANCER SCREENING: ICD-10-CM

## 2018-03-07 DIAGNOSIS — R10.32 LEFT LOWER QUADRANT PAIN: ICD-10-CM

## 2018-03-07 DIAGNOSIS — K59.00 CONSTIPATION, UNSPECIFIED CONSTIPATION TYPE: ICD-10-CM

## 2018-03-07 PROCEDURE — 45380 COLONOSCOPY AND BIOPSY: CPT | Performed by: INTERNAL MEDICINE

## 2018-03-07 PROCEDURE — 25010000002 PROPOFOL 200 MG/20ML EMULSION: Performed by: NURSE ANESTHETIST, CERTIFIED REGISTERED

## 2018-03-07 RX ORDER — PROPOFOL 10 MG/ML
INJECTION, EMULSION INTRAVENOUS AS NEEDED
Status: DISCONTINUED | OUTPATIENT
Start: 2018-03-07 | End: 2018-03-07 | Stop reason: SURG

## 2018-03-07 RX ORDER — SODIUM CHLORIDE 9 MG/ML
70 INJECTION, SOLUTION INTRAVENOUS CONTINUOUS PRN
Status: DISCONTINUED | OUTPATIENT
Start: 2018-03-07 | End: 2018-03-07 | Stop reason: HOSPADM

## 2018-03-07 RX ORDER — SODIUM CHLORIDE 0.9 % (FLUSH) 0.9 %
3 SYRINGE (ML) INJECTION AS NEEDED
Status: DISCONTINUED | OUTPATIENT
Start: 2018-03-07 | End: 2018-03-07 | Stop reason: HOSPADM

## 2018-03-07 RX ADMIN — PROPOFOL 50 MG: 10 INJECTION, EMULSION INTRAVENOUS at 10:15

## 2018-03-07 RX ADMIN — PROPOFOL 50 MG: 10 INJECTION, EMULSION INTRAVENOUS at 10:19

## 2018-03-07 RX ADMIN — PROPOFOL 50 MG: 10 INJECTION, EMULSION INTRAVENOUS at 10:25

## 2018-03-07 RX ADMIN — LIDOCAINE HYDROCHLORIDE 60 MG: 20 INJECTION, SOLUTION INTRAVENOUS at 10:10

## 2018-03-07 RX ADMIN — SODIUM CHLORIDE 70 ML/HR: 9 INJECTION, SOLUTION INTRAVENOUS at 08:40

## 2018-03-07 RX ADMIN — PROPOFOL 50 MG: 10 INJECTION, EMULSION INTRAVENOUS at 10:10

## 2018-03-07 NOTE — PLAN OF CARE
Problem: GI Endoscopy (Adult)  Goal: Signs and Symptoms of Listed Potential Problems Will be Absent or Manageable (GI Endoscopy)  Outcome: Ongoing (interventions implemented as appropriate)   03/07/18 0817   GI Endoscopy   Problems Assessed (GI Endoscopy) all   Problems Present (GI Endoscopy) none

## 2018-03-07 NOTE — OP NOTE
PROCEDURE:  Colonoscopy to the terminal ileum with biopsies.     DATE OF PROCEDURE:  March 7, 2018    REFERRING PROVIDER:  CARINA Clemons     INSTRUMENT USED:  Olympus PCF H 190 videocolonoscope.      INDICATIONS OF THE PROCEDURE:  This is a 22-year-old female with history of irregular bowel habits described as constipation and at Times diarrhea. There is history of abdominal pain. Currently undergoing colonoscopy for further evaluation.      BIOPSIES: Biopsies were obtained from the cecum. Random biopsies were obtained from the colon.       PHOTOGRAPHS:  Photographs were included in the medical records.     MEDICATIONS:  MAC.       CONSENT/PREPROCEDURE EVALUATION:  Risks, benefits, alternatives and options of the procedure including risks of sedation/anesthesia were discussed with the patient and informed consent was obtained prior to the procedure.  History and physical examination were performed and nothing precluded the test.      REPORT:  The patient was placed in left lateral decubitus position and a digital examination was performed.  Once under the influence of IV sedation, the instrument was inserted into the rectum and advanced under direct vision to cecum which was identified by the ileocecal valve, triradiate folds and appendiceal orifice. The scope was then maneuvered into the terminal ileum.        FINDINGS:      Digital rectal examination:  Good anal tone.  No perianal pathology.  No mass.        Terminal ileum:  9-10 cm.  Normal.     Cecum and ascending colon: 2 small 2 mm polypoid nodules were noted in the cecum. They were removed with cold biopsy forceps.      Hepatic flexure, transverse colon, splenic flexure:  Normal.         Descending colon, sigmoid colon and rectum:  No endoscopic evidence of colitis was seen.  Random biopsies were obtained from the colon upon withdrawal of the scope.  A retroflex examination within the rectum revealed internal hemorrhoids.        The scope was  then straightened, the lower GI tract was decompressed, and the scope was pulled out of the patient.  The patient tolerated the procedure well.  There were no immediate complications and the patient was transferred in stable condition for post procedure observation.      TECHNICAL DATA:   1. [place] prep score: 8 (3+2+3).    2. Anus to cecal time: 7  minutes.  3. Difficulty of examination:  Average. The colon was noted to be tortuous and redundant.    4. Withdrawal time: 9 minutes.  5. Procedure time: 20 minutes  6. Retroflex examination in right colon: Yes.    7. Second look Rectum to cecum with decompression: Yes.    DIAGNOSES:    1. Small 2 mm polypoid nodules within the cecum.  2. Internal hemorrhoids.  3. No endoscopic evidence of colitis was seen.  Random biopsies were obtained from the colon upon withdrawal of the scope.      RECOMMENDATIONS:     1. Dietary instructions.  2. Follow biopsies.    3. Follow-up:    3-4 weeks.  4. Followup colonoscopy: Not applicable.     Thank you very much for letting me participate in the care of this patient. Please do not hesitate to call me if you have any questions.

## 2018-03-07 NOTE — DISCHARGE INSTRUCTIONS
Please follow all post op instructions and follow up appointment time from your physician's office included in your discharge packet.  .   No pushing, pulling, tugging,  heavy lifting, or strenuous activity.  No major decision making, driving, or drinking alcoholic beverages for 24 hours. ( due to the medications you have  received)  Always use good hand hygiene/washing techniques.  NO driving while taking pain medications.     To assist you in voiding:  Drink plenty of fluids  Listen to running water while attempting to void.    If you are unable to urinate and you have an uncomfortable urge to void or it has been   6 hours since you were discharged, return to the Emergency Room.      Postprocedure instructions:    Nothing by mouth to fully alert.  Once fully alert may have clear liquid diet.  Advance diet as tolerated.  Vital signs as routine.    Diet:     Avoid Dairy Products.  Fiber One Cereal-40% Nutty Clusters 1/4 cup daily.  [place] All Bran-Bran Buds 1/4 cup daily.  Use Soy or [place] milk.      Blood Thinner Directions:    Avoid Aspirin & other NSAIDS for _7__ days. Tylenol is okay.    Treatments:    Other Instructions:    Call [facility] at [phone] or come to the Emergency Department if you experience the following: Chest pain, abdominal pain, bleeding (vomiting of blood or coffee colored material, black stools or jarvis blood in stools), fever/chills, nausea and vomiting or dizziness.      Follow-up:  DR. BRIANNA JIMENEZ in 4 weeks.Office phone # [phone].    Follow-up colonoscopy: Not applicable.

## 2018-03-07 NOTE — INTERVAL H&P NOTE
"  H&P reviewed. The patient was examined and there are no changes to the H&P.     No new complaints.     No recent shortness of breath or chest pains.    Examination:    Blood pressure 92/55, pulse 65, temperature 97.5 °F (36.4 °C), temperature source Temporal Artery , resp. rate 18, height 157.5 cm (62.01\"), weight 41.7 kg (91 lb 14.9 oz), last menstrual period 02/20/2018, SpO2 99 %, not currently breastfeeding.    Alert and oriented ×3 no apparent distress.  Chest: clear to auscultation.  Cardiac exam: No S3 no murmurs.   Abdomen: soft bowel sounds present nondistended nontender.        "

## 2018-03-07 NOTE — H&P (VIEW-ONLY)
Chief Complaint   Patient presents with   • Nausea   • GI Problem       History of Present Illness     The patient has history of recurrent nausea for the last several months.  The nausea is described as mild to moderate in severity, frequency being a several times per week.  Nauseous feeling may last for several minutes.  Eating worsens the symptom however no definite relieving factors of nausea.  There is occasional associated vomiting. The patient took low-dose Reglan with significant improvement of her symptoms initially. However lately although the patient continues to take Reglan her symptoms have recurred.    There is history of epigastric abdominal pain off and on for the last 2 years.  The pain is gradual in onset, moderate in severity and aching in character.  Frequency being 2-3 times a week.  The pain may last for several minutes.  There is no radiation of abdominal pain.  Eating worsens the abdominal pain.  No definite relieving factors of abdominal pain.      The patient has a history of reflux off and on for the last 4 years.  The reflux is rather severe.  Symptoms are described as retrosternal burning sensation, and indigestion.  There is history of significant regurgitative symptoms.  Frequency being several times per week.  The symptoms are worse at night.  The patient takes acid suppressive therapy with significant breakthrough symptoms.      The patient has difficulty swallowing off and for the last 3 years . The symptom is moderate in severity, occurs occasionally perhaps once or twice a week and is mostly associated with solid foods.  The symptoms are not progressive.  The patient points towards the lower substernal area.  There is no associated weight loss.       The patient has a history of rather severe constipation off and on for the last 6 months. . This is described as hard stools perhaps one bowel movement once a week. The patient takes frequent laxatives to have a bowel movement. There  "is no associated rectal pain. Episodes of constipation are followed by episodes of diarrhea which occurred perhaps once a month or so.  During the diarrheal episodes the patient may have 3-4 bowel movements a day. The diarrhea may last for a day or so. There is no history of overt GI bleed (Hematemesis, melena or hematochezia). There is no history of liver disease or pancreatitis in the past.     In 2014 the patient claims that she started having these symptoms. She was diagnosed to have \"H pylori. The patient has been treated for the same. However her symptoms never improved. [place] She denies psychosocial issues. The patient is not home sick.       Review of Systems   Constitutional: Positive for appetite change and fatigue. Negative for chills, fever and unexpected weight change.   HENT: Positive for trouble swallowing. Negative for mouth sores and nosebleeds.    Eyes: Negative for discharge and redness.   Respiratory: Negative for apnea, cough and shortness of breath.    Cardiovascular: Positive for palpitations. Negative for chest pain and leg swelling.   Gastrointestinal: Positive for abdominal pain, constipation, diarrhea, nausea and vomiting. Negative for abdominal distention, anal bleeding and blood in stool.   Endocrine: Negative for cold intolerance, heat intolerance and polydipsia.   Genitourinary: Negative for dysuria, hematuria and urgency.   Musculoskeletal: Negative for arthralgias, joint swelling and myalgias.   Skin: Negative for rash.   Allergic/Immunologic: Negative for food allergies and immunocompromised state.   Neurological: Negative for dizziness, seizures, syncope and headaches.   Hematological: Negative for adenopathy. Does not bruise/bleed easily.   Psychiatric/Behavioral: Negative for dysphoric mood. The patient is not nervous/anxious and is not hyperactive.      Patient Active Problem List   Diagnosis   • Asthma   • Chronic GERD   • Fatigue   • Mild " persistent asthma without complication   • Left lower quadrant pain   • Constipation   • Diarrhea   • Epigastric pain   • Heartburn   • Dysphagia   • Nausea   • Colon cancer screening     Past Medical History:   Diagnosis Date   • Asthma, extrinsic    • B12 deficiency    • Bronchiectasis    • Diverticulitis of colon     Treated with antibiotics   • GERD (gastroesophageal reflux disease)    • History of being tatooed     FOREARMS   • IBS (irritable bowel syndrome)    • Lactose intolerance    • Migraines    • Problems with swallowing    • Wears glasses     TO READ     Past Surgical History:   Procedure Laterality Date   • ENDOSCOPY N/A 2018    Procedure: ESOPHAGOGASTRODUODENOSCOPY with biopsies;  Surgeon: Esau Robertson MD;  Location: [facility] ENDOSCOPY;  Service:    • NO PAST SURGERIES     • UPPER GASTROINTESTINAL ENDOSCOPY       Family History   Problem Relation Age of Onset   • Irritable bowel syndrome Mother    • No Known Problems Father    • Colon cancer Maternal Uncle    • Stomach cancer Maternal Grandfather         • Colon cancer Maternal Uncle           Social History   Substance Use Topics   • Smoking status: Never Smoker   • Smokeless tobacco: Never Used      Comment: Never smoked   • Alcohol use No       Current Outpatient Prescriptions:   •  albuterol (PROVENTIL HFA;VENTOLIN HFA) 108 (90 Base) MCG/ACT inhaler, Inhale 2 puffs Every 6 (Six) Hours As Needed for Wheezing or Shortness of Air., Disp: 1 inhaler, Rfl: 2  •  Cetirizine HCl 10 MG capsule, Take 10 mg by mouth Every Night., Disp: 30 capsule, Rfl: 3  •  Cyanocobalamin (VITAMIN B-12 IJ), Inject 1 dose as directed Every 7 (Seven) Days., Disp: , Rfl:   •  flunisolide (NASALIDE) 25 MCG/ACT (0.025%) solution nasal spray, Inhale 1 spray Every 12 (Twelve) Hours., Disp: 1 bottle, Rfl: 5  •  fluticasone-salmeterol (ADVAIR) 500-50 MCG/DOSE DISKUS, Inhale 1 puff 2 (Two) Times a Day., Disp: 60 each, Rfl: 3  •   "montelukast (SINGULAIR) 10 MG tablet, Take 1 tablet by mouth Every Night., Disp: 30 tablet, Rfl: 3  •  pantoprazole (PROTONIX) 40 MG EC tablet, Take 1 tablet by mouth Daily. Half hour before breakfast, Disp: 30 tablet, Rfl: 3  •  Tiotropium Bromide Monohydrate (SPIRIVA RESPIMAT) 1.25 MCG/ACT aerosol solution inhaler, Inhale 2 puffs Daily., Disp: 1 inhaler, Rfl: 0  •  linaclotide (LINZESS) 145 MCG capsule capsule, Take 1 capsule by mouth Every Morning Before Breakfast., Disp: 4 capsule, Rfl: 0  •  metoclopramide (REGLAN) 5 MG tablet, Take 0.5 tablets by mouth 4 (Four) Times a Day Before Meals & at Bedtime., Disp: 60 tablet, Rfl: 1    Allergies   Allergen Reactions   • Flu Virus Vaccine Swelling   • Ibuprofen Itching and Rash       Blood pressure 97/47, pulse 56, temperature 97.7 °F (36.5 °C), resp. rate 15, height 157.5 cm (62.01\"), weight 40.8 kg (90 lb), last menstrual period 02/20/2018, not currently breastfeeding.    Physical Exam   Constitutional: She is oriented to person, place, and time. She appears well-developed and well-nourished. No distress.   HENT:   Head: Normocephalic and atraumatic.   Right Ear: Hearing and external ear normal.   Left Ear: Hearing and external ear normal.   Nose: Nose normal.   Mouth/Throat: Oropharynx is clear and moist and mucous membranes are normal. Mucous membranes are not pale, not dry and not cyanotic. No oral lesions. No oropharyngeal exudate.   Eyes: Conjunctivae and EOM are normal. Right eye exhibits no discharge. Left eye exhibits no discharge. No scleral icterus.   Neck: Trachea normal. Neck supple. No JVD present. No edema present. No thyroid mass and no thyromegaly present.   Cardiovascular: Normal rate, regular rhythm, S2 normal and normal heart sounds.  Exam reveals no gallop, no S3 and no friction rub.    No murmur heard.  Pulmonary/Chest: Effort normal and breath sounds normal. No respiratory distress. She has no wheezes. She has no rales. She exhibits no " tenderness.   Abdominal: Soft. Normal appearance and bowel sounds are normal. She exhibits no distension, no ascites and no mass. There is no splenomegaly or hepatomegaly. There is tenderness (mild) in the right upper quadrant, epigastric area and periumbilical area. There is no rigidity, no rebound and no guarding. No hernia.   Musculoskeletal: She exhibits no tenderness or deformity.       Vascular Status -  Her exam exhibits no right foot edema. Her exam exhibits no left foot edema.  Lymphadenopathy:     She has no cervical adenopathy.        Left: No supraclavicular adenopathy present.   Neurological: She is alert and oriented to person, place, and time. She has normal strength. No cranial nerve deficit or sensory deficit. She exhibits normal muscle tone. Coordination normal.   Skin: No rash noted. She is not diaphoretic. No cyanosis. No pallor. Nails show no clubbing.   Psychiatric: She has a normal mood and affect. Her behavior is normal. Judgment and thought content normal.   Nursing note and vitals reviewed.    Stigmata of chronic liver disease:  None.  Asterixis:  None.    Laboratory Testing:  Upon review of medical records:      Dated April 11, 2016 white count 9.8 hemoglobin 10.9 hematocrit 33 and platelet count 344.  MCV 85.3.     Dated July 13, 2016 sodium 144 potassium 5.1 chloride 105 CO2 23 BUN 22 creatinine 1.4 and glucose 336.  Calcium 9.1 early 0.8 AST 52 ALT 40 albumen 4.3 and alkaline phosphatase 111.  Lipase 126.  White count 9.8 hemoglobin 11.4 hematocrit 34 and a platelet count 312.  MCV 88.7.     Dated March 13, 2017 albumin 3.80.  T bili 0.5 AST 48 ALT 33 alkaline phosphatase 169.  Total cholesterol 98.  Triglycerides 111.  TSH 1.508.  C-reactive protein 32.30.  Vitamin B12 level 498.  Serum iron 43.  TIBC 248.  Iron saturation 17%.  Ferritin 139.00.  WBC 8.38 hemoglobin 11.2 hematocrit 34.9 MCV 87.0 platelet count 267.     Dated August 5, 2017 sodium 140 potassium 4.5 chloride 100 CO2 26  BUN 18 serum creatinine 1.00 glucose 171.  Calcium 8.8.  Albumin 4.20.  T bili 0.9.  AST 81 ALT 67 alkaline phosphatase 181.  Lipase 136.  WBC 12.33 hemoglobin 12.1 hematocrit 36.3 MCV 86.4 platelet count 275.     Dated August 11, 2017 sodium 138 potassium 4.0 chloride 107 CO2 19 BUN 8 serum creatinine 0.90 glucose 296.  Calcium 8.4.  WBC 7.60 hemoglobin 10.2 hematocrit 30.8 MCV 86.8 and platelet count 174.     Dated 8/29/2017 glucose 209 BUN 9 creatinine 1.0 sodium 137 potassium 4.8 chloride 103 CO2 22 calcium 8.3 albumin 4.0 ALT 73  alkaline phosphatase 139 total bilirubin 1.1 WBC 7.09 hemoglobin 10.4 hematocrit 31.8 platelet count 243 MCV 88.6 lipase 88 d-dimer 743 troponin <0.012 hepatitis A IgM: Negative, hepatitis be surface antigen: Negative, hepatitis B core IgM: Negative, hepatitis C viral antibody: 0.3     Dated 11/16/2017 glucose 225 BUN 13 creatinine 0.8 sodium 142 potassium 5.1 chloride 103 CO2 26 calcium 9.1 albumin 3.8 total bilirubin 0.4 alkaline phosphatase 247 AST 57 ALT 65 TSH 1.070 vitamin B12 405 vitamin D 37.2 EVON negati rheumatoid factor <10 sedimentation rate 55 CRP 2.8     Dated 12/6/2017 glucose 86 BUN 15 creatinine 1.2 sodium 141 potassium 4.7 chloride 105 CO2 23 calcium 9.2 albumin 4.2 ALT 75 AST 10 9:00 phosphatase 148 total bilirubin 0.5 WBC 11.14 hemoglobin 11.3 hematocrit 34.6 platelet count 261 MCV 90.1 lipase 110 CA 1 2510.5     Dated 12/19/2017 glucose 252 BUN 13 creatinine 1.0 sodium 141 potassium 4.4 chloride 108 CO2 22 calcium 8.7 albumin 3.9 ALT 34 AST 30 7:00 phosphatase 203 total bilirubin 1.0W BC 8.49 hemoglobin 10.2 hematocrit 31.4 platelet count 202 MCV 90.0 lipase 95     Abdominal imaging results:  Upon review of medical records:     Dated May 30, 2016 CT of abdomen and pelvis without contrast revealed: the lung bases demonstrate mild bibasilar atelectasis. There are LAD coronary artery calcifications. The heart is normal in size. There is limited images of the  liver are unremarkable. The patient is status post cholecystectomy. The spleen is normal. No adrenal masses identified. The aorta is normal in caliber. There is no significant free fluid or adenopathy. There is no nephrolithiasis or hydronephrosis. There is a small duodenal diverticulum. There is mild sigmoid diverticulosis without evidence of diverticulitis. There is constipation without evidence of obstruction. The appendix is normal. A tiny umbilical hernia is seen containing fat. There is a small amount of free fluid in the pelvis which may be physiologic. There is a possible nabothian cyst seen in the cervix. The uterus and adnexal structures are unremarkable. Urinary bladder is decompressed with mild wall thickening.     Dated September 7, 2016 the patient underwent a CT of the Abdomen and Pelvis without contrast which revealed:  The gallbladder has been removed.  The solid abdominal organs and ureters are normal.  The GI tract is unremarkable, including the appendix.  Mild diverticulosis.  There is a 7.0 left ovarian cyst.  The uterus, ovaries and urinary bladder are otherwise normal.     Dated November 4, 2016 the patient underwent a CT of the abdomen and pelvis without contrast which revealed: No renal or ureteral stone or hydronephrosis.  Bladder is decompressed with nonspecific wall thickening.  Liver, spleen, pancreas and adrenal glands appear normal.  The bowel gas pattern is nonobstructive.  Appendix appears normal.  Moderate stool is present.  Uterus is surgically absent.  Minimal free fluid in the pelvis with postsurgical fat stranding.  No encapsulated fluid flexion to suggest abscess.  Ovaries are not clearly distinguishable from adjacent unopacified bowel.     Dated January 27, 2017 the patient underwent a small bowel follow-through which revealed: The  film is unremarkable.  The transit time to colon is normal.  The mucosal fold pattern is normal.  Spot images of the terminal ileum are  unremarkable.  Normal small bowel follow through.       Dated March 27, 2017 the patient underwent a CT of the abdomen and pelvis without contrast which revealed: No renal or ureteral stone or hydronephrosis.  Bladder is normal in contour.  Liver, spleen, pancreas and adrenal glands appear normal.  The bowel gas pattern is nonobstructive.  No focal inflammatory changes are present.  No evidence for appendicitis.  Mild sigmoid diverticulosis without diverticulitis.     Dated July 9, 2017 the patient underwent a CT of the abdomen and pelvis without contrast which revealed: Gallbladder has been removed.  Solid abdominal organs and ureters are normal.  GI tract is unremarkable, including the appendix.  Uterus and ovaries are absent.  The   Urinary bladder is unremarkable.  No pelvic or abdominal adenopathy, ascites or significant osseous abnormality.     CT of abdomen and pelvis with contrast dated 8/29/2017 reveals bilateral lower lobe atelectasis.  The liver is low-attenuation consistent with fatty infiltration.  The spleen, pancreas, adrenal glands and kidneys are unremarkable.  The bowel gas pattern is nonobstructive.  There is no wall thickening or mesenteric inflammatory stranding.  The appendix appears normal.  There is diverticulosis without diverticulitis.  The bladder is normal in contour.  There is no free fluid, free air or adenopathy.     Procedures:    Upon review of medical records:     Dated August 1, 2016 colonoscopy to terminal ileum revealed left-sided diverticulosis, colon polyps, internal and small external hemorrhoids.  Biopsies from the colon polyps revealed tubular adenomata.  No endoscopic evidence of ileitis or colitis was seen.  Random biopsies from the colon including rectum were negative for microscopic colitis.     Dated August 2, 2016 upper endoscopy revealed: Erythematous distal esophagitis, erythematous gastritis, gastric polyps and Changes consistent with Gastroparesis-delayed gastric  emptying (partly digested food residue within the stomach, patient nothing by mouth for 10 hours, and no gastric outlet obstruction).  Biopsies from the second portion of duodenum were negative for celiac disease.  Gastric biopsies were negative for Helicobacter pylori.  Gastric polyp biopsies did not reveal adenomatous change.     Dated August 9, 2017 the patient underwent an upper endoscopy which revealed: Distal esophagitis.  Erythematous, and 0.5 cm clean-based ulcer in the distal esophagus including erythema on the posterior wall.  Although, the patient is a typical the changes may represent pill esophagitis.  Small sliding hiatal hernia less than 3 cm.  Significant tortuosity and tertiary contractions of esophageal body.  Erythematous gastritis.  Gastric polyps.  No candidiasis was seen.  Changes seen within the esophagus are not consistent with herpes esophagitis.  Although, the patient is predisposed to CMV esophagitis in view of immunosuppression. However, the appearance of the distal esophageal ulcers not typical of CMV ulcer.    Dated December 22, 2017 the patient underwent an upper endoscopy which revealed: Erythematous distal esophagitis.  Erythematous-erosive gastritis.  Changes consistent with delayed gastric emptying (partly digested food residue within the stomach, patient nothing by mouth for 10 hours, and no gastric outlet obstruction).  Small sliding hiatal hernia less than 3 cm.  Gastric fundic polyp.  Antrum, biopsies revealed reactive gastropathy and regenerative changes suggesting a healing erosion.  Mild chronic inactive gastritis.  Negative for intestinal metaplasia, dysplasia or malignancy.  Helicobacter-like organisms not seen on routine stain.           Assessment and Plan:      Jocelyne was seen today for nausea and gi problem.    Diagnoses and all orders for this visit:    Nausea  Comments:  Possible underlying biliary dyskinesia.   Orders:  -     NM HIDA Scan With Pharmacological  Intervention; Future    Periumbilical abdominal pain  Comments:  Possible underlying biliary dyskinesia. History suggestive of postinfectious IBS type picture.  Orders:  -     NM HIDA Scan With Pharmacological Intervention; Future    Heartburn  Comments:  Significant reflux.  Orders:  -     pantoprazole (PROTONIX) 40 MG EC tablet; Take 1 tablet by mouth Daily. Half hour before breakfast    Constipation, unspecified constipation type  -     linaclotide (LINZESS) 145 MCG capsule capsule; Take 1 capsule by mouth Every Morning Before Breakfast.  -     TSH; Future    Other orders  -     metoclopramide (REGLAN) 5 MG tablet; Take 0.5 tablets by mouth 4 (Four) Times a Day Before Meals & at Bedtime.              Plan  and Patient Instructions:    Patient Instructions   1. Low-fat diet.  2. Pantoprazole 40 mg 1 by mouth every morning one half hour before breakfast.  3. Reglan 2.5 mg 1 by mouth 3-4 times a day half hour before food.  Discussed with the patient in detail. Side effects were discussed as well.  4. Linzess capsule 145 µg. Take 1 capsule on empty stomach in the morning. Samples given.  5. Check TSH.  6. [facility]CK HIDA scan for gallbladder ejection fraction.  7. The patient has been scheduled for colonoscopy in March 2018.  8. Discussed with the patient in detail. Opportunity was given to ask questions.        Esau Robertson MD

## 2018-03-07 NOTE — ANESTHESIA PREPROCEDURE EVALUATION
Anesthesia Evaluation     Patient summary reviewed and Nursing notes reviewed   no history of anesthetic complications:  NPO Solid Status: > 8 hours  NPO Liquid Status: > 8 hours           Airway   Mallampati: I  TM distance: >3 FB  Neck ROM: full  no difficulty expected  Dental - normal exam     Pulmonary - negative pulmonary ROS and normal exam   Cardiovascular - negative cardio ROS and normal exam        Neuro/Psych- negative ROS  GI/Hepatic/Renal/Endo    (+)  GERD,      Musculoskeletal (-) negative ROS    Abdominal    Substance History - negative use     OB/GYN negative ob/gyn ROS         Other - negative ROS                       Anesthesia Plan    ASA 2     MAC     intravenous induction   Anesthetic plan and risks discussed with patient.

## 2018-03-07 NOTE — ANESTHESIA POSTPROCEDURE EVALUATION
Patient: Jocelyne Branch    Procedure Summary     Date Anesthesia Start Anesthesia Stop Room / Location    03/07/18 1000 1034 [facility] ENDOSCOPY 2 / [facility] ENDOSCOPY       Procedure Diagnosis Surgeon Provider    COLONOSCOPY WITH BIOPSIES (N/A Anus) Left lower quadrant pain; Colon cancer screening; Constipation, unspecified constipation type; Diarrhea, unspecified type  (Left lower quadrant pain [R10.32]; Colon cancer screening [Z12.11]; Constipation, unspecified constipation type [K59.00]; Diarrhea, unspecified type [R19.7]) MD Raymond Fabian CRNA          Anesthesia Type: MAC  Last vitals  BP   91/57 (03/07/18 1105)   Temp   97.1 °F (36.2 °C) (03/07/18 1105)   Pulse   57 (03/07/18 1105)   Resp   18 (03/07/18 1105)     SpO2   100 % (03/07/18 1105)     Post Anesthesia Care and Evaluation    Patient location during evaluation: bedside  Patient participation: complete - patient participated  Level of consciousness: awake  Pain score: 0  Pain management: adequate  Airway patency: patent  Anesthetic complications: No anesthetic complications  PONV Status: controlled  Cardiovascular status: acceptable and stable  Respiratory status: acceptable and room air  Hydration status: acceptable

## 2018-03-10 LAB
LAB AP CASE REPORT: NORMAL
Lab: NORMAL
PATH REPORT.FINAL DX SPEC: NORMAL

## 2018-04-26 ENCOUNTER — OFFICE VISIT (OUTPATIENT)
Dept: GASTROENTEROLOGY | Facility: CLINIC | Age: 23
End: 2018-04-26

## 2018-04-26 VITALS
BODY MASS INDEX: 17.11 KG/M2 | DIASTOLIC BLOOD PRESSURE: 52 MMHG | HEIGHT: 62 IN | RESPIRATION RATE: 18 BRPM | SYSTOLIC BLOOD PRESSURE: 84 MMHG | TEMPERATURE: 97.5 F | WEIGHT: 93 LBS | HEART RATE: 64 BPM

## 2018-04-26 DIAGNOSIS — R11.0 NAUSEA: ICD-10-CM

## 2018-04-26 DIAGNOSIS — K59.00 CONSTIPATION, UNSPECIFIED CONSTIPATION TYPE: ICD-10-CM

## 2018-04-26 DIAGNOSIS — R13.10 DYSPHAGIA, UNSPECIFIED TYPE: ICD-10-CM

## 2018-04-26 DIAGNOSIS — R10.13 EPIGASTRIC PAIN: Primary | ICD-10-CM

## 2018-04-26 DIAGNOSIS — R12 HEARTBURN: ICD-10-CM

## 2018-04-26 PROCEDURE — 99214 OFFICE O/P EST MOD 30 MIN: CPT | Performed by: INTERNAL MEDICINE

## 2018-04-26 NOTE — PROGRESS NOTES
Chief Complaint   Patient presents with   • Nausea     History of Present Illness     The patient came back for follow visit today. She feels much better. The patient had history of nausea for the last several months. The nausea was moderately severe frequency being several times per week. Nauseous feeling would last for a couple of hours. Eating will worsen the nausea. Currently, the patient is taking Reglan 2.5 mg 4 times a day-half an hour before meals and at bedtime. With this regimen her nausea has significantly improved. In fact the patient denies further nausea. Her epigastric abdominal pain has also resolved. She denies side effects related to Reglan.    The patient has a history of rather severe constipation for the last several months. This is described as hard stools perhaps one bowel movement once or twice a week. The patient was taking frequent laxatives to have a bowel movement. Currently she feels much better in terms of constipation as well.  She is taking Linzess 145 µg daily. The patient has been having bowel movement on daily basis.    The patient has a history of reflux off and on for the last 4 years.  The reflux is rather severe.  Symptoms are described as retrosternal burning sensation, and indigestion.  There is history of occasional regurgitative symptoms.  Frequency being several times per week.  The symptoms are worse at night. Her reflux symptoms have significantly improved. She denies fa or odynophagia. There is no history of overt GI bleed (Hematemesis, melena or hematochezia). The patient is eating better. She has gained 3 pounds over the last 2 months.     Review of Systems   Constitutional: Positive for appetite change. Negative for chills, fatigue, fever and unexpected weight change.   HENT: Negative for mouth sores, nosebleeds and trouble swallowing.    Eyes: Negative for discharge and redness.   Respiratory: Negative for apnea, cough and shortness of breath.    Cardiovascular:  Negative for chest pain, palpitations and leg swelling.   Gastrointestinal: Negative for abdominal distention, abdominal pain, anal bleeding, blood in stool, constipation, diarrhea, nausea and vomiting.   Endocrine: Negative for cold intolerance, heat intolerance and polydipsia.   Genitourinary: Negative for dysuria, hematuria and urgency.   Musculoskeletal: Negative for arthralgias, joint swelling and myalgias.   Skin: Negative for rash.   Allergic/Immunologic: Negative for food allergies and immunocompromised state.   Neurological: Negative for dizziness, seizures, syncope and headaches.   Hematological: Negative for adenopathy. Does not bruise/bleed easily.   Psychiatric/Behavioral: Negative for dysphoric mood. The patient is not nervous/anxious and is not hyperactive.      Patient Active Problem List   Diagnosis   • Asthma   • Chronic GERD   • Fatigue   • Mild persistent asthma without complication   • Left lower quadrant pain   • Constipation   • Diarrhea   • Epigastric pain   • Heartburn   • Dysphagia   • Nausea   • Colon cancer screening     Past Medical History:   Diagnosis Date   • Asthma, extrinsic    • B12 deficiency 2017   • Bronchiectasis    • Diverticulitis of colon December, 2017    Treated with antibiotics   • GERD (gastroesophageal reflux disease)    • History of being tatooed     FOREARMS   • IBS (irritable bowel syndrome)    • Lactose intolerance 2016   • Migraines 2010   • Problems with swallowing    • Wears glasses     TO READ     Past Surgical History:   Procedure Laterality Date   • COLONOSCOPY N/A 3/7/2018    Procedure: COLONOSCOPY WITH BIOPSIES;  Surgeon: Esau Robertson MD;  Location: [facility];  Service:    • ENDOSCOPY N/A 1/8/2018    Procedure: ESOPHAGOGASTRODUODENOSCOPY with biopsies;  Surgeon: Esau Robertson MD;  Location: [facility];  Service:    • NO PAST SURGERIES     • UPPER GASTROINTESTINAL ENDOSCOPY  January, 2018     Family History   Problem Relation Age of Onset   •  Irritable bowel syndrome Mother    • No Known Problems Father    • Colon cancer Maternal Uncle    • Stomach cancer Maternal Grandfather         • Colon cancer Maternal Uncle           Social History   Substance Use Topics   • Smoking status: Never Smoker   • Smokeless tobacco: Never Used      Comment: Never smoked   • Alcohol use No       Current Outpatient Prescriptions:   •  albuterol (PROVENTIL HFA;VENTOLIN HFA) 108 (90 Base) MCG/ACT inhaler, Inhale 2 puffs Every 6 (Six) Hours As Needed for Wheezing or Shortness of Air., Disp: 1 inhaler, Rfl: 2  •  Cetirizine HCl 10 MG capsule, Take 10 mg by mouth Every Night., Disp: 30 capsule, Rfl: 3  •  Cyanocobalamin (VITAMIN B-12 IJ), Inject 1 dose as directed Every 7 (Seven) Days., Disp: , Rfl:   •  flunisolide (NASALIDE) 25 MCG/ACT (0.025%) solution nasal spray, Inhale 1 spray Every 12 (Twelve) Hours., Disp: 1 bottle, Rfl: 5  •  fluticasone-salmeterol (ADVAIR) 500-50 MCG/DOSE DISKUS, Inhale 1 puff 2 (Two) Times a Day., Disp: 60 each, Rfl: 3  •  linaclotide (LINZESS) 145 MCG capsule capsule, Take 1 capsule by mouth Every Morning Before Breakfast. (Patient taking differently: Take 145 mcg by mouth Every Morning Before Breakfast. Pt states she has not started this prescription yet.), Disp: 4 capsule, Rfl: 0  •  metoclopramide (REGLAN) 5 MG tablet, Take 0.5 tablets by mouth 4 (Four) Times a Day Before Meals & at Bedtime., Disp: 60 tablet, Rfl: 1  •  montelukast (SINGULAIR) 10 MG tablet, Take 1 tablet by mouth Every Night., Disp: 30 tablet, Rfl: 3  •  pantoprazole (PROTONIX) 40 MG EC tablet, Take 1 tablet by mouth Daily. Half hour before breakfast, Disp: 30 tablet, Rfl: 3  •  Tiotropium Bromide Monohydrate (SPIRIVA RESPIMAT) 1.25 MCG/ACT aerosol solution inhaler, Inhale 2 puffs Daily., Disp: 1 inhaler, Rfl: 0    Allergies   Allergen Reactions   • Flu Virus Vaccine Swelling   • Eggs Or Egg-Derived Products Rash   • Ibuprofen Itching and Rash       Blood pressure (!)  "84/52, pulse 64, temperature 97.5 °F (36.4 °C), resp. rate 18, height 157.5 cm (62.01\"), weight 42.2 kg (93 lb), last menstrual period 04/19/2018, not currently breastfeeding.  The patient usually runs low blood pressure.    Physical Exam   Constitutional: She is oriented to person, place, and time. She appears well-developed and well-nourished. No distress.   HENT:   Head: Normocephalic and atraumatic.   Right Ear: Hearing and external ear normal.   Left Ear: Hearing and external ear normal.   Nose: Nose normal.   Mouth/Throat: Oropharynx is clear and moist and mucous membranes are normal. Mucous membranes are not pale, not dry and not cyanotic. No oral lesions. No oropharyngeal exudate.   Eyes: Conjunctivae and EOM are normal. Right eye exhibits no discharge. Left eye exhibits no discharge. No scleral icterus.   Neck: Trachea normal. Neck supple. No JVD present. No edema present. No thyroid mass and no thyromegaly present.   Cardiovascular: Normal rate, regular rhythm, S2 normal and normal heart sounds.  Exam reveals no gallop, no S3 and no friction rub.    No murmur heard.  Pulmonary/Chest: Effort normal and breath sounds normal. No respiratory distress. She has no wheezes. She has no rales. She exhibits no tenderness.   Abdominal: Soft. Normal appearance and bowel sounds are normal. She exhibits no distension, no ascites and no mass. There is no splenomegaly or hepatomegaly. There is no tenderness. There is no rigidity, no rebound and no guarding. No hernia.   Musculoskeletal: She exhibits no tenderness or deformity.     Vascular Status -  Her right foot exhibits no edema. Her left foot exhibits no edema.  Lymphadenopathy:     She has no cervical adenopathy.        Left: No supraclavicular adenopathy present.   Neurological: She is alert and oriented to person, place, and time. She has normal strength. No cranial nerve deficit or sensory deficit. She exhibits normal muscle tone. Coordination normal.   Skin: No " rash noted. She is not diaphoretic. No cyanosis. No pallor. Nails show no clubbing.   Psychiatric: She has a normal mood and affect. Her behavior is normal. Judgment and thought content normal.   Nursing note and vitals reviewed.  Stigmata of chronic liver disease:  None.  Asterixis:  None.      Laboratory Testing:  Upon review of medical records:      Dated 5/25/2017 WBC 4.35 hemoglobin 13.4 hematocrit 41.0 place a count 245 MCV 98.6    Dated January 24, 2018 sodium 141 potassium 4.0 chloride 102 CO2 30 BUN 6 serum creatinine 0.60 glucose 88.  Calcium 9.2.  Total protein 7.6.  Albumin 4.60.  T bili 0.4 AST 37 ALT 47 alkaline phosphatase 109.  tTG IgA <2.  IgA quantitative 222.  WBC 3.50 hemoglobin 13.0 hematocrit 39.2 MCV 93.8 and platelet count 248.    Dated February 27, 2018 TSH 1.230.    Abdominal Imaging:  Upon review of medical records:    Dated January 30, 2018 the patient underwent a right upper quadrant abdominal ultrasound which revealed: No gallstones are seen.  No evidence of pericholecystic fluid is identified.  Visualized liver parenchyma appears normal.  No intrahepatic duct dilatation is identified.  No evidence of common bile duct dilatation is identified.  The upper common duct measured 2.6 mm.  Pancreas is obscured.  Right kidney shows no evidence of obstruction.     Dated March 5, 2018 the patient underwent a nuclear medicine hepatobiliary scan which revealed: Normal hepatobiliary scan with a gallbladder ejection fraction of 71%.    Procedures:  Upon review of medical records:       Dated January 8, 2018 patient underwent an upper endoscopy which revealed: Erythematous distal esophagitis.  Small sliding hiatal hernia less than 3 cm.  Erythematous gastritis.  No Schatzki’s ring or strictures were noted within the esophagus.  No concentric rings were seen.  Biopsies were obtained from the mid and distal esophagus.  The likely etiology of intermittent dysphasia is esophageal dysmotility.  Bile  tinged secretions were seen within the stomach.  These were suctioned.  No active ulcer was noted.  Second portion of duodenum, biopsies revealed no pathologic alterations.  Negative for celiac disease, microorganisms, metaplasia or atypia.  Antrum, body, angulus biopsies revealed mild chronic gastritis.  Negative for H. pylori, metaplasia, dysplasia or malignancy.  Distal and midesophagus, biopsies revealed reactive squamous mucosa (no eosinophils identified).  Negative for metaplasia, dysplasia or malignancy.    Dated March 7, 2018 the patient underwent a colonoscopy to the terminal ileum which revealed: Small to MM polypoid nodules within the cecum.  Internal hemorrhoids.  No endoscopic evidence of colitis was seen.  Random biopsies were obtained from the colon upon withdrawal of scope.  Cecum, biopsy revealed benign colonic mucosa with lymphoid aggregate and no diagnostic abnormality.  Random colon, biopsies revealed benign colonic mucosa with no diagnostic abnormality.    Assessment:      ICD-10-CM ICD-9-CM   1. Epigastric pain R10.13 789.06   2. Nausea R11.0 787.02   3. Heartburn R12 787.1   4. Constipation, unspecified constipation type K59.00 564.00   5. Dysphagia, unspecified type R13.10 787.20         Discussion:  1. 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 Currently, she has improved significantly.    Plan/  Patient Instructions   1. Antireflux measures.  2. Pantoprazole 40 mg 1 by mouth every morning one half hour before breakfast.  3. Linzess capsule 145 µg. Take 1 capsule on empty stomach in the morning.  4. The patient may try to decrease the frequency of Reglan. She may take 2.5 mg in the morning and at nigh preferably half an hour before meals. If she continues to feel well the patient may also drop the morning dose while continuing the evening dose for a week. The patient may then stop the evening dose and watch her symptoms. However, if the patient has recurrence of  nausea she may resume Reglan. Short and long-term side effects have been discussed. Alternatively, if needed for long-term the patient may benefit from Motilium (not available in [place]). [place]  5. The patient may call back in one to 2 weeks regarding her progress.  6. Follow-up:  The patient will call back.       Esau Robertson MD

## 2018-04-26 NOTE — PATIENT INSTRUCTIONS
1. Antireflux measures.  2. Pantoprazole 40 mg 1 by mouth every morning one half hour before breakfast.  3. Linzess capsule 145 µg. Take 1 capsule on empty stomach in the morning.  4. The patient may try to decrease the frequency of Reglan. She may take 2.5 mg in the morning and at nigh preferably half an hour before meals. If she continues to feel well the patient may also drop the morning dose while continuing the evening dose for a week. The patient may then stop the evening dose and watch her symptoms. However, if the patient has recurrence of nausea she may resume Reglan. Short and long-term side effects have been discussed. Alternatively, if needed for long-term the patient may benefit from Motilium (not available in [place]  5. The patient may call back in one to 2 weeks regarding her progress.  6. Follow-up:  The patient will call back.

## 2018-05-03 RX ORDER — METOCLOPRAMIDE 5 MG/1
TABLET ORAL
Qty: 60 TABLET | Refills: 0 | Status: SHIPPED | OUTPATIENT
Start: 2018-05-03 | End: 2018-06-13 | Stop reason: SDUPTHER

## 2018-06-13 DIAGNOSIS — R12 HEARTBURN: ICD-10-CM

## 2018-06-13 RX ORDER — METOCLOPRAMIDE 5 MG/1
TABLET ORAL
Qty: 60 TABLET | Refills: 1 | Status: SHIPPED | OUTPATIENT
Start: 2018-06-13

## 2018-06-13 RX ORDER — PANTOPRAZOLE SODIUM 40 MG/1
40 TABLET, DELAYED RELEASE ORAL DAILY
Qty: 30 TABLET | Refills: 3 | Status: SHIPPED | OUTPATIENT
Start: 2018-06-13 | End: 2018-09-19 | Stop reason: SDUPTHER

## 2018-06-13 NOTE — TELEPHONE ENCOUNTER
----- Message from Jocelyne Branch sent at 6/13/2018 10:48 AM EDT -----  Regarding: RE: Prescription Question  Contact: [phone]  Good morning,    Thank you for your response. The nearest pharmacy is [facility]. The phone number is [phone] and the address is [address] [place] [address] [place] [address].      Thank you,    Jocelyne Pang      ----- Message -----  From: CARINA More  Sent: 6/13/2018 10:25 AM EDT  To: Jocelyne Branch  Subject: RE: Prescription Question  Jocelyne,    If you can get us the phone number and address of the [facility]'s you want the prescriptions sent to, we can send it in to them. Just let us know which medications you need refilled.     If you continue to have fever, abdominal pain and diarrhea, you need to be seen for evaluation, as it is hard to know if you have a viral syndrome or something more. If your insurance will not cover a primary clinic there, you can check with the urgent treatment center or the ER by calling them to see if they will accept your insurance, as some places do accept those insurances in an urgent situation. Make sure you are drinking plenty of fluids.    I hope you feel better. Just let us know about the pharmacy.    CARINA Montiel        ----- Message -----     From: Jocelyne Branch     Sent: 6/13/2018  3:26 AM EDT       To: Esau Robertson MD  Subject: Prescription Question    Dear Doctor Marcelo,    Good morning, I am writing for two reasons. First, I need a refill on my medications but i am out of state. I am currently at [place], [place]. Therefore, I need my prescription in a way such that I can get my medication at [facility] here.     Second, I had a fever this past couple of days and I have diarrea stomach pain, bloating, nausea and headaches. I think I might have an intestinal infection so I was eondering what to do about it. As I said, I am out of state and I cannot get back until August and  unfortunately, my student health insurance is the only insurance I have so I cannot go to a doctor here in [place]. I apologize if frenchs wondering if you had any suggestions.    Thank you,    Jocelyne Pang

## 2018-08-15 ENCOUNTER — OFFICE VISIT (OUTPATIENT)
Dept: PULMONOLOGY | Facility: CLINIC | Age: 23
End: 2018-08-15

## 2018-08-15 VITALS
RESPIRATION RATE: 16 BRPM | DIASTOLIC BLOOD PRESSURE: 62 MMHG | HEIGHT: 62 IN | HEART RATE: 70 BPM | WEIGHT: 93 LBS | BODY MASS INDEX: 17.11 KG/M2 | SYSTOLIC BLOOD PRESSURE: 84 MMHG | OXYGEN SATURATION: 98 %

## 2018-08-15 DIAGNOSIS — R06.02 SHORTNESS OF BREATH: Primary | ICD-10-CM

## 2018-08-15 DIAGNOSIS — J45.40 MODERATE PERSISTENT ASTHMA WITHOUT COMPLICATION: ICD-10-CM

## 2018-08-15 DIAGNOSIS — J30.89 OTHER ALLERGIC RHINITIS: ICD-10-CM

## 2018-08-15 PROCEDURE — 99214 OFFICE O/P EST MOD 30 MIN: CPT | Performed by: NURSE PRACTITIONER

## 2018-08-15 RX ORDER — METOCLOPRAMIDE 5 MG/1
TABLET ORAL
Qty: 60 TABLET | Refills: 0 | Status: SHIPPED | OUTPATIENT
Start: 2018-08-15 | End: 2018-10-30 | Stop reason: SDUPTHER

## 2018-08-15 RX ORDER — ALBUTEROL SULFATE 90 UG/1
2 AEROSOL, METERED RESPIRATORY (INHALATION) EVERY 6 HOURS PRN
Qty: 1 INHALER | Refills: 2 | Status: SHIPPED | OUTPATIENT
Start: 2018-08-15

## 2018-08-15 RX ORDER — FLUNISOLIDE 0.25 MG/ML
1 SOLUTION NASAL EVERY 12 HOURS
Qty: 1 BOTTLE | Refills: 5 | Status: SHIPPED | OUTPATIENT
Start: 2018-08-15

## 2018-08-15 RX ORDER — MONTELUKAST SODIUM 10 MG/1
10 TABLET ORAL NIGHTLY
Qty: 30 TABLET | Refills: 3 | Status: SHIPPED | OUTPATIENT
Start: 2018-08-15

## 2018-08-15 NOTE — PROGRESS NOTES
"Chief Complaint   Patient presents with   • Follow-up   • Shortness of Breath         Subjective   Jocelyne Branch is a 23 y.o. female.     History of Present Illness   The patient comes in today for follow-up of shortness of breath, asthma, and allergic rhinitis.    She has been doing better.  She is not coughing up thick green/black sputum as often and she has noticed that she usually does it after doing laundry or when she is around pets.  She feels like these are triggers for increased productive cough.    She is using Advair twice a day and Spiriva daily.  She takes Singulair at night and Zyrtec during the day.  She is using Nasalide twice a day with good results.    She has not needed to use the rescue inhaler in over a week.  She uses it mostly with dusting and sweeping her house.    The following portions of the patient's history were reviewed and updated as appropriate: allergies, current medications, past family history, past medical history, past social history and past surgical history.    Review of Systems   HENT: Negative for sinus pressure, sneezing and sore throat.    Respiratory: Negative for cough, shortness of breath and wheezing.        Objective   Visit Vitals  BP (!) 84/62   Pulse 70   Resp 16   Ht 157.5 cm (62.01\")   Wt 42.2 kg (93 lb)   SpO2 98%   BMI 17.01 kg/m²     Physical Exam   Constitutional: She is oriented to person, place, and time. She appears well-developed and well-nourished.   HENT:   Head: Normocephalic and atraumatic.   Mouth/Throat: Oropharynx is clear and moist.   Crowded oropharynx.    Eyes: EOM are normal.   Neck: Neck supple.   Cardiovascular: Normal rate and regular rhythm.    Pulmonary/Chest: Effort normal and breath sounds normal. No respiratory distress. She has no wheezes.   Musculoskeletal: She exhibits no edema.   Gait normal.   Neurological: She is alert and oriented to person, place, and time.   Skin: Skin is warm and dry.   Psychiatric: She has a normal " mood and affect.   Vitals reviewed.          Assessment/Plan   Jocelyne was seen today for follow-up and shortness of breath.    Diagnoses and all orders for this visit:    Shortness of breath    Moderate persistent asthma without complication    Other allergic rhinitis    Other orders  -     fluticasone-salmeterol (ADVAIR) 500-50 MCG/DOSE DISKUS; Inhale 1 puff 2 (Two) Times a Day.  -     flunisolide (NASALIDE) 25 MCG/ACT (0.025%) solution nasal spray; Inhale 1 spray Every 12 (Twelve) Hours.  -     Cetirizine HCl 10 MG capsule; Take 10 mg by mouth Every Night.  -     montelukast (SINGULAIR) 10 MG tablet; Take 1 tablet by mouth Every Night.  -     Tiotropium Bromide Monohydrate (SPIRIVA RESPIMAT) 1.25 MCG/ACT aerosol solution inhaler; Inhale 2 puffs Daily.  -     albuterol (PROVENTIL HFA;VENTOLIN HFA) 108 (90 Base) MCG/ACT inhaler; Inhale 2 puffs Every 6 (Six) Hours As Needed for Wheezing or Shortness of Air.           Return in about 5 months (around 1/15/2019) for Recheck, For Dr. Nicholson.    DISCUSSION (if any):  At this time her asthma and allergic rhinitis seems to be under decent control with the current medications.  I have asked her to continue using all of the above-mentioned medications.  Refills have been provided.  He should continue to use her rescue inhaler as needed for increased shortness of breath.    She has identified triggers but increase her coughing and even makes her cough productive sore asked her to try to avoid those triggers as much as possible.      Dictated utilizing Dragon dictation.    This document was electronically signed by CARINA Calabrese August 23, 2018  9:46 AM

## 2018-09-19 DIAGNOSIS — R12 HEARTBURN: ICD-10-CM

## 2018-09-19 RX ORDER — PANTOPRAZOLE SODIUM 40 MG/1
TABLET, DELAYED RELEASE ORAL
Qty: 30 TABLET | Refills: 4 | Status: SHIPPED | OUTPATIENT
Start: 2018-09-19

## 2018-10-30 ENCOUNTER — OFFICE VISIT (OUTPATIENT)
Dept: GASTROENTEROLOGY | Facility: CLINIC | Age: 23
End: 2018-10-30

## 2018-10-30 VITALS
DIASTOLIC BLOOD PRESSURE: 104 MMHG | RESPIRATION RATE: 16 BRPM | WEIGHT: 95 LBS | TEMPERATURE: 98.3 F | HEIGHT: 62 IN | BODY MASS INDEX: 17.48 KG/M2 | SYSTOLIC BLOOD PRESSURE: 142 MMHG | HEART RATE: 54 BPM

## 2018-10-30 DIAGNOSIS — K59.00 CONSTIPATION, UNSPECIFIED CONSTIPATION TYPE: ICD-10-CM

## 2018-10-30 DIAGNOSIS — R12 HEARTBURN: Primary | ICD-10-CM

## 2018-10-30 DIAGNOSIS — R11.0 NAUSEA: ICD-10-CM

## 2018-10-30 PROCEDURE — 99214 OFFICE O/P EST MOD 30 MIN: CPT | Performed by: INTERNAL MEDICINE

## 2018-10-30 NOTE — PATIENT INSTRUCTIONS
1. Antireflux measures.  2. Pantoprazole 40 mg 1 by mouth every morning one half hour before breakfast.  3. Low-fat-high-fiber diet with liberal water intake.  4. Reglan-metoclopramide 2.5 mg orally on an as-needed basis (currently the patient takes it perhaps once a day on occasions). She has decreased it from 4 times per day.  5. The patient is off Linzess.  6. Follow-up the patient will call back on an as-needed basis.

## 2018-10-30 NOTE — PROGRESS NOTES
Chief Complaint   Patient presents with   • Heartburn     History of Present Illness     The patient has a history of reflux off and on for the last 4-5 years.  The reflux is rather severe.  Symptoms are described as retrosternal burning sensation, and indigestion.  There is history of occasional regurgitative symptoms.  Frequency being several times per week.  The symptoms are worse at night.  The patient takes acid suppressive therapy with good control of her symptoms. However when she misses the dose the patient has recurrence of symptoms.    The patient had history of nausea for the last several months. The nausea was moderately severe frequency being several times per week. Nauseous feeling would last for a couple of hours. Eating will worsen the nausea. Currently, the patient is taking Reglan 2.5 mg 4 times a day-half an hour before meals and at bedtime. With this regimen her nausea had significantly improved. The patient has tapered down the Reglan. Currently she uses 2.5 mg on occasions.     The patient has a history of rather severe constipation for the last several months. This is described as hard stools perhaps one bowel movement once or twice a week. The patient was taking frequent laxatives to have a bowel movement. Currently she feels much better in terms of constipation as well. Currently,  the patient is off Linzess. She is been having one bowel movement on daily basis.     She denies fa or odynophagia. There is no history of overt GI bleed (Hematemesis, melena or  hematochezia).      Review of Systems   Constitutional: Negative for appetite change, chills, fatigue, fever and unexpected weight change.   HENT: Negative for mouth sores, nosebleeds and trouble swallowing.    Eyes: Negative for discharge and redness.   Respiratory: Negative for apnea, cough and shortness of breath.    Cardiovascular: Negative for chest pain, palpitations and leg swelling.   Gastrointestinal: Positive for nausea.  Negative for abdominal distention, abdominal pain, anal bleeding, blood in stool, constipation, diarrhea and vomiting.   Endocrine: Negative for cold intolerance, heat intolerance and polydipsia.   Genitourinary: Negative for dysuria, hematuria and urgency.   Musculoskeletal: Negative for arthralgias, joint swelling and myalgias.   Skin: Negative for rash.   Allergic/Immunologic: Negative for food allergies and immunocompromised state.   Neurological: Negative for dizziness, seizures, syncope and headaches.   Hematological: Negative for adenopathy. Does not bruise/bleed easily.   Psychiatric/Behavioral: Negative for dysphoric mood. The patient is not nervous/anxious and is not hyperactive.      Patient Active Problem List   Diagnosis   • Asthma   • Chronic GERD   • Fatigue   • Mild persistent asthma without complication   • Left lower quadrant pain   • Constipation   • Diarrhea   • Epigastric pain   • Heartburn   • Dysphagia   • Nausea   • Colon cancer screening     Past Medical History:   Diagnosis Date   • Asthma, extrinsic    • B12 deficiency 2017   • Bronchiectasis (CMS/HCC)    • Diverticulitis of colon December, 2017    Treated with antibiotics   • Diverticulitis of colon December, 2017    Treated with antibiotics   • GERD (gastroesophageal reflux disease)    • GERD (gastroesophageal reflux disease)     Has gotten worse over the last few months   • History of being tatooed     FOREARMS   • IBS (irritable bowel syndrome)    • Irritable bowel syndrome     Never treated   • Lactose intolerance 2016   • Lactose intolerance 2016   • Migraines 2010   • Problems with swallowing    • Wears glasses     TO READ     Past Surgical History:   Procedure Laterality Date   • COLONOSCOPY N/A 3/7/2018    Procedure: COLONOSCOPY WITH BIOPSIES;  Surgeon: Esau Robertson MD;  Location: [facility] ENDOSCOPY;  Service:    • ENDOSCOPY N/A 1/8/2018    Procedure: ESOPHAGOGASTRODUODENOSCOPY with biopsies;  Surgeon: Esau Robertson MD;   "Location: [facility] ENDOSCOPY;  Service:    • NO PAST SURGERIES     • UPPER GASTROINTESTINAL ENDOSCOPY       Family History   Problem Relation Age of Onset   • Irritable bowel syndrome Mother    • No Known Problems Father    • Colon cancer Maternal Uncle    • Stomach cancer Maternal Grandfather            • Colon cancer Maternal Uncle              Social History   Substance Use Topics   • Smoking status: Never Smoker   • Smokeless tobacco: Never Used      Comment: Never smoked   • Alcohol use No       Current Outpatient Prescriptions:   •  albuterol (PROVENTIL HFA;VENTOLIN HFA) 108 (90 Base) MCG/ACT inhaler, Inhale 2 puffs Every 6 (Six) Hours As Needed for Wheezing or Shortness of Air., Disp: 1 inhaler, Rfl: 2  •  Cetirizine HCl 10 MG capsule, Take 10 mg by mouth Every Night., Disp: 30 capsule, Rfl: 3  •  Cyanocobalamin (VITAMIN B-12 IJ), Inject 1 dose as directed Every 7 (Seven) Days., Disp: , Rfl:   •  flunisolide (NASALIDE) 25 MCG/ACT (0.025%) solution nasal spray, Inhale 1 spray Every 12 (Twelve) Hours., Disp: 1 bottle, Rfl: 5  •  fluticasone-salmeterol (ADVAIR) 500-50 MCG/DOSE DISKUS, Inhale 1 puff 2 (Two) Times a Day., Disp: 60 each, Rfl: 3  •  metoclopramide (REGLAN) 5 MG tablet, Take 1/2 tablet by mouth four times per day before meals and at bedtime, Disp: 60 tablet, Rfl: 1  •  montelukast (SINGULAIR) 10 MG tablet, Take 1 tablet by mouth Every Night., Disp: 30 tablet, Rfl: 3  •  pantoprazole (PROTONIX) 40 MG EC tablet, TAKE ONE TABLET BY MOUTH EVERY DAY 30 MINUTES BEFORE BREAKFAST, Disp: 30 tablet, Rfl: 4  •  Tiotropium Bromide Monohydrate (SPIRIVA RESPIMAT) 1.25 MCG/ACT aerosol solution inhaler, Inhale 2 puffs Daily., Disp: 1 inhaler, Rfl: 0    Allergies   Allergen Reactions   • Flu Virus Vaccine Swelling   • Eggs Or Egg-Derived Products Rash   • Ibuprofen Itching and Rash       Blood pressure (!) 142/104, pulse 54, temperature 98.3 °F (36.8 °C), resp. rate 16, height 157.5 cm (62\"), " weight 43.1 kg (95 lb), last menstrual period 10/24/2018, not currently breastfeeding.    Physical Exam   Constitutional: She is oriented to person, place, and time. She appears well-developed and well-nourished. No distress.   HENT:   Head: Normocephalic and atraumatic.   Right Ear: Hearing and external ear normal.   Left Ear: Hearing and external ear normal.   Nose: Nose normal.   Mouth/Throat: Oropharynx is clear and moist and mucous membranes are normal. Mucous membranes are not pale, not dry and not cyanotic. No oral lesions. No oropharyngeal exudate.   Eyes: Conjunctivae and EOM are normal. Right eye exhibits no discharge. Left eye exhibits no discharge. No scleral icterus.   Neck: Trachea normal. Neck supple. No JVD present. No edema present. No thyroid mass and no thyromegaly present.   Cardiovascular: Normal rate, regular rhythm, S2 normal and normal heart sounds.  Exam reveals no gallop, no S3 and no friction rub.    No murmur heard.  Pulmonary/Chest: Effort normal and breath sounds normal. No respiratory distress. She has no wheezes. She has no rales. She exhibits no tenderness.   Abdominal: Soft. Normal appearance and bowel sounds are normal. She exhibits no distension, no ascites and no mass. There is no splenomegaly or hepatomegaly. There is no tenderness. There is no rigidity, no rebound and no guarding. No hernia.   Musculoskeletal: She exhibits no tenderness or deformity.     Vascular Status -  Her right foot exhibits no edema. Her left foot exhibits no edema.  Lymphadenopathy:     She has no cervical adenopathy.        Left: No supraclavicular adenopathy present.   Neurological: She is alert and oriented to person, place, and time. She has normal strength. No cranial nerve deficit or sensory deficit. She exhibits normal muscle tone. Coordination normal.   Skin: No rash noted. She is not diaphoretic. No cyanosis. No pallor. Nails show no clubbing.   Psychiatric: She has a normal mood and affect. Her  behavior is normal. Judgment and thought content normal.   Nursing note and vitals reviewed.  Stigmata of chronic liver disease:  None.  Asterixis:  None.    Laboratory Testing:  Upon review of medical records:    Dated 5/25/2017 WBC 4.35 hemoglobin 13.4 hematocrit 41.0 place a count 245 MCV 98.6     Dated January 24, 2018 sodium 141 potassium 4.0 chloride 102 CO2 30 BUN 6 serum creatinine 0.60 glucose 88.  Calcium 9.2.  Total protein 7.6.  Albumin 4.60.  T bili 0.4 AST 37 ALT 47 alkaline phosphatase 109.  tTG IgA <2.  IgA quantitative 222.  WBC 3.50 hemoglobin 13.0 hematocrit 39.2 MCV 93.8 and platelet count 248.     Dated February 27, 2018 TSH 1.230.     Abdominal Imaging:  Upon review of medical records:     Dated January 30, 2018 the patient underwent a right upper quadrant abdominal ultrasound which revealed: No gallstones are seen.  No evidence of pericholecystic fluid is identified.  Visualized liver parenchyma appears normal.  No intrahepatic duct dilatation is identified.  No evidence of common bile duct dilatation is identified.  The upper common duct measured 2.6 mm.  Pancreas is obscured.  Right kidney shows no evidence of obstruction.      Dated March 5, 2018 the patient underwent a nuclear medicine hepatobiliary scan which revealed: Normal hepatobiliary scan with a gallbladder ejection fraction of 71%.     Procedures:  Upon review of medical records:       Dated January 8, 2018 patient underwent an upper endoscopy which revealed: Erythematous distal esophagitis.  Small sliding hiatal hernia less than 3 cm.  Erythematous gastritis.  No Schatzki’s ring or strictures were noted within the esophagus.  No concentric rings were seen.  Biopsies were obtained from the mid and distal esophagus.  The likely etiology of intermittent dysphasia is esophageal dysmotility.  Bile tinged secretions were seen within the stomach.  These were suctioned.  No active ulcer was noted.  Second portion of duodenum, biopsies  revealed no pathologic alterations.  Negative for celiac disease, microorganisms, metaplasia or atypia.  Antrum, body, angulus biopsies revealed mild chronic gastritis.  Negative for H. pylori, metaplasia, dysplasia or malignancy.  Distal and midesophagus, biopsies revealed reactive squamous mucosa (no eosinophils identified).  Negative for metaplasia, dysplasia or malignancy.     Dated March 7, 2018 the patient underwent a colonoscopy to the terminal ileum which revealed: Small to MM polypoid nodules within the cecum.  Internal hemorrhoids.  No endoscopic evidence of colitis was seen.  Random biopsies were obtained from the colon upon withdrawal of scope.  Cecum, biopsy revealed benign colonic mucosa with lymphoid aggregate and no diagnostic abnormality.  Random colon, biopsies revealed benign colonic mucosa with no diagnostic abnormality.      Assessment:      ICD-10-CM ICD-9-CM   1. Heartburn R12 787.1   2. Nausea R11.0 787.02   3. Constipation, unspecified constipation type K59.00 564.00         Discussion:  1.     Plan/  Patient Instructions   1. Antireflux measures.  2. Pantoprazole 40 mg 1 by mouth every morning one half hour before breakfast.  3. Low-fat-high-fiber diet with liberal water intake.  4. Reglan-metoclopramide 2.5 mg orally on an as-needed basis (currently the patient takes it perhaps once a day on occasions). She has decreased it from 4 times per day.  5. The patient is off Linzess.  6. Follow-up the patient will call back on an as-needed basis.         Esau Robertson MD

## (undated) DEVICE — FRCP BIOP COLD ENDOJAW ALLGTR W/NDL 2.8X2300MM BLU

## (undated) DEVICE — ENDOGATOR AUXILIARY WATER JET CONNECTOR: Brand: ENDOGATOR

## (undated) DEVICE — Device

## (undated) DEVICE — 2000CC GUARDIAN II: Brand: GUARDIAN

## (undated) DEVICE — CONMED SCOPE SAVER BITE BLOCK, 20X27 MM: Brand: SCOPE SAVER

## (undated) DEVICE — JELLY,LUBE,STERILE,FLIP TOP,TUBE,2-OZ: Brand: MEDLINE